# Patient Record
Sex: FEMALE | Race: BLACK OR AFRICAN AMERICAN | NOT HISPANIC OR LATINO
[De-identification: names, ages, dates, MRNs, and addresses within clinical notes are randomized per-mention and may not be internally consistent; named-entity substitution may affect disease eponyms.]

---

## 2021-01-18 PROBLEM — Z00.00 ENCOUNTER FOR PREVENTIVE HEALTH EXAMINATION: Status: ACTIVE | Noted: 2021-01-18

## 2021-01-19 ENCOUNTER — ASOB RESULT (OUTPATIENT)
Age: 31
End: 2021-01-19

## 2021-01-19 ENCOUNTER — APPOINTMENT (OUTPATIENT)
Dept: ANTEPARTUM | Facility: CLINIC | Age: 31
End: 2021-01-19
Payer: COMMERCIAL

## 2021-01-19 PROCEDURE — 76816 OB US FOLLOW-UP PER FETUS: CPT

## 2021-01-19 PROCEDURE — 99072 ADDL SUPL MATRL&STAF TM PHE: CPT

## 2021-01-19 PROCEDURE — 76819 FETAL BIOPHYS PROFIL W/O NST: CPT

## 2021-02-11 ENCOUNTER — TRANSCRIPTION ENCOUNTER (OUTPATIENT)
Age: 31
End: 2021-02-11

## 2021-02-12 ENCOUNTER — INPATIENT (INPATIENT)
Facility: HOSPITAL | Age: 31
LOS: 2 days | Discharge: HOME | End: 2021-02-15
Attending: OBSTETRICS & GYNECOLOGY | Admitting: OBSTETRICS & GYNECOLOGY

## 2021-02-12 VITALS
SYSTOLIC BLOOD PRESSURE: 116 MMHG | RESPIRATION RATE: 16 BRPM | TEMPERATURE: 98 F | DIASTOLIC BLOOD PRESSURE: 67 MMHG | HEART RATE: 82 BPM

## 2021-02-12 DIAGNOSIS — Z3A.00 WEEKS OF GESTATION OF PREGNANCY NOT SPECIFIED: ICD-10-CM

## 2021-02-12 DIAGNOSIS — O26.899 OTHER SPECIFIED PREGNANCY RELATED CONDITIONS, UNSPECIFIED TRIMESTER: ICD-10-CM

## 2021-02-12 LAB
BASOPHILS # BLD AUTO: 0 K/UL — SIGNIFICANT CHANGE UP (ref 0–0.2)
BASOPHILS NFR BLD AUTO: 0 % — SIGNIFICANT CHANGE UP (ref 0–2)
BLD GP AB SCN SERPL QL: NEGATIVE — SIGNIFICANT CHANGE UP
EOSINOPHIL # BLD AUTO: 0 K/UL — SIGNIFICANT CHANGE UP (ref 0–0.5)
EOSINOPHIL NFR BLD AUTO: 0 % — SIGNIFICANT CHANGE UP (ref 0–6)
GIANT PLATELETS BLD QL SMEAR: PRESENT — SIGNIFICANT CHANGE UP
HBV SURFACE AG SER-ACNC: SIGNIFICANT CHANGE UP
HCT VFR BLD CALC: 36.5 % — SIGNIFICANT CHANGE UP (ref 34.5–45)
HGB BLD-MCNC: 12.1 G/DL — SIGNIFICANT CHANGE UP (ref 11.5–15.5)
HIV 1+2 AB+HIV1 P24 AG SERPL QL IA: SIGNIFICANT CHANGE UP
IANC: 7.41 K/UL — SIGNIFICANT CHANGE UP (ref 1.5–8.5)
LYMPHOCYTES # BLD AUTO: 1.98 K/UL — SIGNIFICANT CHANGE UP (ref 1–3.3)
LYMPHOCYTES # BLD AUTO: 18.6 % — SIGNIFICANT CHANGE UP (ref 13–44)
MANUAL SMEAR VERIFICATION: SIGNIFICANT CHANGE UP
MCHC RBC-ENTMCNC: 30.4 PG — SIGNIFICANT CHANGE UP (ref 27–34)
MCHC RBC-ENTMCNC: 33.2 GM/DL — SIGNIFICANT CHANGE UP (ref 32–36)
MCV RBC AUTO: 91.7 FL — SIGNIFICANT CHANGE UP (ref 80–100)
MONOCYTES # BLD AUTO: 0.19 K/UL — SIGNIFICANT CHANGE UP (ref 0–0.9)
MONOCYTES NFR BLD AUTO: 1.8 % — LOW (ref 2–14)
NEUTROPHILS # BLD AUTO: 8.1 K/UL — HIGH (ref 1.8–7.4)
NEUTROPHILS NFR BLD AUTO: 76.1 % — SIGNIFICANT CHANGE UP (ref 43–77)
PLAT MORPH BLD: NORMAL — SIGNIFICANT CHANGE UP
PLATELET # BLD AUTO: 113 K/UL — LOW (ref 150–400)
PLATELET COUNT - ESTIMATE: ABNORMAL
POLYCHROMASIA BLD QL SMEAR: SLIGHT — SIGNIFICANT CHANGE UP
RBC # BLD: 3.98 M/UL — SIGNIFICANT CHANGE UP (ref 3.8–5.2)
RBC # FLD: 13.8 % — SIGNIFICANT CHANGE UP (ref 10.3–14.5)
RBC BLD AUTO: NORMAL — SIGNIFICANT CHANGE UP
RH IG SCN BLD-IMP: POSITIVE — SIGNIFICANT CHANGE UP
RH IG SCN BLD-IMP: POSITIVE — SIGNIFICANT CHANGE UP
SARS-COV-2 IGG SERPL QL IA: NEGATIVE — SIGNIFICANT CHANGE UP
SARS-COV-2 IGM SERPL IA-ACNC: 0.08 INDEX — SIGNIFICANT CHANGE UP
SARS-COV-2 RNA SPEC QL NAA+PROBE: SIGNIFICANT CHANGE UP
T PALLIDUM AB TITR SER: NEGATIVE — SIGNIFICANT CHANGE UP
VARIANT LYMPHS # BLD: 3.5 % — SIGNIFICANT CHANGE UP (ref 0–6)
WBC # BLD: 10.64 K/UL — HIGH (ref 3.8–10.5)
WBC # FLD AUTO: 10.64 K/UL — HIGH (ref 3.8–10.5)

## 2021-02-12 RX ORDER — HEPARIN SODIUM 5000 [USP'U]/ML
5000 INJECTION INTRAVENOUS; SUBCUTANEOUS EVERY 12 HOURS
Refills: 0 | Status: DISCONTINUED | OUTPATIENT
Start: 2021-02-12 | End: 2021-02-15

## 2021-02-12 RX ORDER — SIMETHICONE 80 MG/1
80 TABLET, CHEWABLE ORAL EVERY 4 HOURS
Refills: 0 | Status: DISCONTINUED | OUTPATIENT
Start: 2021-02-12 | End: 2021-02-15

## 2021-02-12 RX ORDER — LANOLIN
1 OINTMENT (GRAM) TOPICAL EVERY 6 HOURS
Refills: 0 | Status: DISCONTINUED | OUTPATIENT
Start: 2021-02-12 | End: 2021-02-15

## 2021-02-12 RX ORDER — OXYTOCIN 10 UNIT/ML
2 VIAL (ML) INJECTION
Qty: 30 | Refills: 0 | Status: DISCONTINUED | OUTPATIENT
Start: 2021-02-12 | End: 2021-02-13

## 2021-02-12 RX ORDER — IBUPROFEN 200 MG
1 TABLET ORAL
Qty: 0 | Refills: 0 | DISCHARGE
Start: 2021-02-12

## 2021-02-12 RX ORDER — SODIUM CHLORIDE 9 MG/ML
1000 INJECTION, SOLUTION INTRAVENOUS
Refills: 0 | Status: DISCONTINUED | OUTPATIENT
Start: 2021-02-12 | End: 2021-02-14

## 2021-02-12 RX ORDER — TETANUS TOXOID, REDUCED DIPHTHERIA TOXOID AND ACELLULAR PERTUSSIS VACCINE, ADSORBED 5; 2.5; 8; 8; 2.5 [IU]/.5ML; [IU]/.5ML; UG/.5ML; UG/.5ML; UG/.5ML
0.5 SUSPENSION INTRAMUSCULAR ONCE
Refills: 0 | Status: DISCONTINUED | OUTPATIENT
Start: 2021-02-12 | End: 2021-02-15

## 2021-02-12 RX ORDER — METOCLOPRAMIDE HCL 10 MG
10 TABLET ORAL ONCE
Refills: 0 | Status: COMPLETED | OUTPATIENT
Start: 2021-02-12 | End: 2021-02-12

## 2021-02-12 RX ORDER — OXYCODONE HYDROCHLORIDE 5 MG/1
5 TABLET ORAL ONCE
Refills: 0 | Status: DISCONTINUED | OUTPATIENT
Start: 2021-02-12 | End: 2021-02-15

## 2021-02-12 RX ORDER — OXYCODONE HYDROCHLORIDE 5 MG/1
5 TABLET ORAL
Refills: 0 | Status: COMPLETED | OUTPATIENT
Start: 2021-02-12 | End: 2021-02-19

## 2021-02-12 RX ORDER — SODIUM CHLORIDE 9 MG/ML
1000 INJECTION, SOLUTION INTRAVENOUS
Refills: 0 | Status: DISCONTINUED | OUTPATIENT
Start: 2021-02-12 | End: 2021-02-12

## 2021-02-12 RX ORDER — FAMOTIDINE 10 MG/ML
20 INJECTION INTRAVENOUS ONCE
Refills: 0 | Status: COMPLETED | OUTPATIENT
Start: 2021-02-12 | End: 2021-02-12

## 2021-02-12 RX ORDER — ACETAMINOPHEN 500 MG
3 TABLET ORAL
Qty: 0 | Refills: 0 | DISCHARGE
Start: 2021-02-12

## 2021-02-12 RX ORDER — OXYTOCIN 10 UNIT/ML
2 VIAL (ML) INJECTION
Qty: 30 | Refills: 0 | Status: DISCONTINUED | OUTPATIENT
Start: 2021-02-12 | End: 2021-02-12

## 2021-02-12 RX ORDER — OXYTOCIN 10 UNIT/ML
333.33 VIAL (ML) INJECTION
Qty: 20 | Refills: 0 | Status: DISCONTINUED | OUTPATIENT
Start: 2021-02-12 | End: 2021-02-13

## 2021-02-12 RX ORDER — IBUPROFEN 200 MG
600 TABLET ORAL EVERY 6 HOURS
Refills: 0 | Status: COMPLETED | OUTPATIENT
Start: 2021-02-12 | End: 2022-01-11

## 2021-02-12 RX ORDER — OXYTOCIN 10 UNIT/ML
333.33 VIAL (ML) INJECTION
Qty: 20 | Refills: 0 | Status: DISCONTINUED | OUTPATIENT
Start: 2021-02-12 | End: 2021-02-12

## 2021-02-12 RX ORDER — SODIUM CHLORIDE 9 MG/ML
250 INJECTION, SOLUTION INTRAVENOUS ONCE
Refills: 0 | Status: COMPLETED | OUTPATIENT
Start: 2021-02-12 | End: 2021-02-12

## 2021-02-12 RX ORDER — AMPICILLIN TRIHYDRATE 250 MG
1 CAPSULE ORAL EVERY 4 HOURS
Refills: 0 | Status: DISCONTINUED | OUTPATIENT
Start: 2021-02-12 | End: 2021-02-12

## 2021-02-12 RX ORDER — ACETAMINOPHEN 500 MG
975 TABLET ORAL
Refills: 0 | Status: DISCONTINUED | OUTPATIENT
Start: 2021-02-12 | End: 2021-02-15

## 2021-02-12 RX ORDER — DIPHENHYDRAMINE HCL 50 MG
25 CAPSULE ORAL EVERY 6 HOURS
Refills: 0 | Status: DISCONTINUED | OUTPATIENT
Start: 2021-02-12 | End: 2021-02-15

## 2021-02-12 RX ORDER — AMPICILLIN TRIHYDRATE 250 MG
2 CAPSULE ORAL ONCE
Refills: 0 | Status: COMPLETED | OUTPATIENT
Start: 2021-02-12 | End: 2021-02-12

## 2021-02-12 RX ORDER — KETOROLAC TROMETHAMINE 30 MG/ML
30 SYRINGE (ML) INJECTION EVERY 6 HOURS
Refills: 0 | Status: DISCONTINUED | OUTPATIENT
Start: 2021-02-12 | End: 2021-02-13

## 2021-02-12 RX ORDER — MAGNESIUM HYDROXIDE 400 MG/1
30 TABLET, CHEWABLE ORAL
Refills: 0 | Status: DISCONTINUED | OUTPATIENT
Start: 2021-02-12 | End: 2021-02-15

## 2021-02-12 RX ORDER — SODIUM CHLORIDE 9 MG/ML
500 INJECTION, SOLUTION INTRAVENOUS ONCE
Refills: 0 | Status: COMPLETED | OUTPATIENT
Start: 2021-02-12 | End: 2021-02-12

## 2021-02-12 RX ORDER — CITRIC ACID/SODIUM CITRATE 300-500 MG
30 SOLUTION, ORAL ORAL ONCE
Refills: 0 | Status: COMPLETED | OUTPATIENT
Start: 2021-02-12 | End: 2021-02-12

## 2021-02-12 RX ADMIN — Medication 10 MILLIGRAM(S): at 13:45

## 2021-02-12 RX ADMIN — SODIUM CHLORIDE 500 MILLILITER(S): 9 INJECTION, SOLUTION INTRAVENOUS at 12:15

## 2021-02-12 RX ADMIN — HEPARIN SODIUM 5000 UNIT(S): 5000 INJECTION INTRAVENOUS; SUBCUTANEOUS at 21:00

## 2021-02-12 RX ADMIN — Medication 2 MILLIUNIT(S)/MIN: at 06:57

## 2021-02-12 RX ADMIN — Medication 216 GRAM(S): at 02:49

## 2021-02-12 RX ADMIN — Medication 108 GRAM(S): at 11:15

## 2021-02-12 RX ADMIN — Medication 2 MILLIUNIT(S)/MIN: at 12:16

## 2021-02-12 RX ADMIN — SODIUM CHLORIDE 75 MILLILITER(S): 9 INJECTION, SOLUTION INTRAVENOUS at 15:42

## 2021-02-12 RX ADMIN — Medication 108 GRAM(S): at 06:54

## 2021-02-12 RX ADMIN — Medication 975 MILLIGRAM(S): at 21:00

## 2021-02-12 RX ADMIN — SODIUM CHLORIDE 500 MILLILITER(S): 9 INJECTION, SOLUTION INTRAVENOUS at 10:15

## 2021-02-12 RX ADMIN — FAMOTIDINE 20 MILLIGRAM(S): 10 INJECTION INTRAVENOUS at 13:45

## 2021-02-12 RX ADMIN — Medication 30 MILLILITER(S): at 13:45

## 2021-02-12 RX ADMIN — Medication 1000 MILLIUNIT(S)/MIN: at 15:25

## 2021-02-12 NOTE — OB RN TRIAGE NOTE - PMH
<<----- Click to add NO pertinent Past Medical History No pertinent past medical history Spontaneous   2020

## 2021-02-12 NOTE — OB PROVIDER TRIAGE NOTE - HISTORY OF PRESENT ILLNESS
30y black female  at 38w5d presents to triage c/o contractions q 6mins at home  Reports +FM, no vaginal bleeding, no ROM or LOF  Prenatal care: Dr White  GBS: Positive

## 2021-02-12 NOTE — OB PROVIDER TRIAGE NOTE - NSOBPROVIDERNOTE_OBGYN_ALL_OB_FT
30y black female  at 38w5d in labor  GBS: Positive  D/w Dr Can/ Dr Mena  -Admit to labor and delivery  -Pain Management: Epidural  -Cont EFM/[place]  -Admission labs: CBC, RPR, T&S and Covid  -IV hydration  -Clear liquid diet

## 2021-02-12 NOTE — DISCHARGE NOTE OB - PATIENT PORTAL LINK FT
You can access the FollowMyHealth Patient Portal offered by [facility] by registering at the following website: [email or web address]. By joining [facility]’s FollowMyHealth portal, you will also be able to view your health information using other applications (apps) compatible with our system.

## 2021-02-12 NOTE — OB PROVIDER H&P - ASSESSMENT
30y black female  at 38w5d in labor  GBS: Positive  D/w Dr Can/ Dr Mena  -Admit to labor and delivery  -Pain Management: Epidural  -Cont EFM/[place]  -Admission labs: CBC, RPR, T&S and Covid  -IV hydration  -Clear liquid diet  -Ampicillin

## 2021-02-12 NOTE — OB RN DELIVERY SUMMARY - NS_LABORCHARACTER_OBGYN_ALL_OB
Induction of labor-AROM/Induction of labor-Medicinal/External electronic FM/Antibiotics in labor/Meconium staining

## 2021-02-12 NOTE — DISCHARGE NOTE OB - HOSPITAL COURSE
Patient underwent an uncomplicated primary LTCS for NRFHT.      Hct: 36.5  Patient’s postoperative course was unremarkable and she remained hemodynamically stable and afebrile throughout. Upon discharge on POD2, the patient was ambulating and voiding spontaneously, tolerating oral intake, pain was well controlled with oral medication, and vital signs were stable.

## 2021-02-12 NOTE — OB PROVIDER H&P - PROBLEM SELECTOR PLAN 1
D/w Dr Can/ Dr Mena  -Admit to labor and delivery  -Pain Management: Epidural  -Cont EFM/[place]  -Admission labs: CBC, RPR, T&S and Covid  -IV hydration  -Clear liquid diet  -Ampicillin

## 2021-02-12 NOTE — DISCHARGE NOTE OB - PROVIDER TOKENS
FREE:[LAST:[[facility]],PHONE:[phone]],FAX:[(   )    -],ADDRESS:[Ambulatory Care Unit, Oncology [facility]  [phone] [place] [address]]]

## 2021-02-12 NOTE — DISCHARGE NOTE OB - CARE PLAN
Principal Discharge DX:	 delivery delivered  Goal:	full recovery  Assessment and plan of treatment:	Make your follow-up appointment with your doctor as ordered. No heavy lifting, driving, or strenuous activity for 6 weeks. Nothing per vagina such as tampons, intercourse, douches or tub baths for 6 weeks or until you see your doctor. Call your doctor with any signs and symptoms of infection such as fever, chills, nausea or vomiting. Call your doctor with redness or swelling at the incision site, fluid leakage or wound separation. Call your doctor if you’re unable to tolerate food, increase in vaginal bleeding, or have difficulty urinating. Call your doctor if you have pain that is not relieved by your prescribed medications. Notify your doctor with any other concerns. Call [phone] if you have any of these concerns in the next 6 weeks.    Please schedule appointments to see us in the Ob/Gyn clinic.   Please schedule one appointment TWO WEEKS from discharge for a post operative incision check.   Please schedule another appointment SIX WEEKS from discharge for a routine post partum visit.

## 2021-02-12 NOTE — DISCHARGE NOTE OB - ADDITIONAL INSTRUCTIONS
Make your follow-up appointment with your doctor as ordered. No heavy lifting, driving, or strenuous activity for 6 weeks. Nothing per vagina such as tampons, intercourse, douches or tub baths for 6 weeks or until you see your doctor. Call your doctor with any signs and symptoms of infection such as fever, chills, nausea or vomiting. Call your doctor with redness or swelling at the incision site, fluid leakage or wound separation. Call your doctor if you’re unable to tolerate food, increase in vaginal bleeding, or have difficulty urinating. Call your doctor if you have pain that is not relieved by your prescribed medications. Notify your doctor with any other concerns. Call [phone] if you have any of these concerns in the next 6 weeks.    Please schedule appointments to see us in the Ob/Gyn clinic.   Please schedule one appointment TWO WEEKS from discharge for a post operative incision check.   Please schedule another appointment SIX WEEKS from discharge for a routine post partum visit.

## 2021-02-12 NOTE — OB PROVIDER H&P - NSHPPHYSICALEXAM_GEN_ALL_CORE
T(C): 36.5 (02-12-21 @ 01:10), Max: 36.5 (02-12-21 @ 01:10)  HR: 82 (02-12-21 @ 01:10) (82 - 82)  BP: 116/67 (02-12-21 @ 01:10) (116/67 - 116/67)  RR: 16 (02-12-21 @ 01:10) (16 - 16)    Heart: RRR  Lungs: CTA  Abdomen: Gravid, soft, NT    NST: Reactive with moderate variability, Category 1 tracing  [place]: Regular contractions  VE: 4/60/-3, intact membranes

## 2021-02-12 NOTE — OB PROVIDER TRIAGE NOTE - NSHPPHYSICALEXAM_GEN_ALL_CORE
T(C): 36.5 (02-12-21 @ 01:10), Max: 36.5 (02-12-21 @ 01:10)  HR: 82 (02-12-21 @ 01:10) (82 - 82)  BP: 116/67 (02-12-21 @ 01:10) (116/67 - 116/67)  RR: 16 (02-12-21 @ 01:10) (16 - 16)    Heart: RRR  Lungs: CTA  Abdomen: Gravid, soft, NT    NST: Reactive with moderate variability, Category 1 tracing  [place]: Regular contractions  VE: 4/60/-3, intact membranes

## 2021-02-12 NOTE — DISCHARGE NOTE OB - MEDICATION SUMMARY - MEDICATIONS TO TAKE
I will START or STAY ON the medications listed below when I get home from the hospital:    acetaminophen 325 mg oral tablet  -- 3 tab(s) by mouth   -- Indication: For Pain    ibuprofen 600 mg oral tablet  -- 1 tab(s) by mouth every 6 hours  -- Indication: For Pain    PNV Prenatal oral tablet  -- 1 tab(s) by mouth once a day  -- Indication: For Vitamins   I will START or STAY ON the medications listed below when I get home from the hospital:    ibuprofen 600 mg oral tablet  -- 1 tab(s) by mouth every 6 hours, As Needed  -- Indication: For pain    acetaminophen 325 mg oral tablet  -- 3 tab(s) by mouth , As Needed  -- Indication: For pain    PNV Prenatal oral tablet  -- 1 tab(s) by mouth once a day  -- Indication: For Vitamins

## 2021-02-12 NOTE — DISCHARGE NOTE OB - MATERIALS PROVIDED
[facility] [place] Screening Program/[place]  Immunization Record/Breastfeeding Log/Bottle Feeding Log/Guide to Postpartum Care/Shaken Baby Prevention Handout/Breastfeeding Guide and Packet/Birth Certificate Instructions/Discharge Medication Information for Patients and Families Pocket Guide

## 2021-02-12 NOTE — OB NEONATOLOGY/PEDIATRICIAN DELIVERY SUMMARY - NSPEDSNEONOTESA_OBGYN_ALL_OB_FT
Called by Dr. White to attend  C/S delivery due to meconium . Baby is  product of a 38.5 week gestation born to a G 2 P 0010   30 year old female   Maternal labs include Blood Type  B+, HIV neg. , RPR NR , Hep B[- ], GBS + on 2/4/21, rest is unremarkable.  .   ROM at  1200, approximately  2.5 hrs.  Resuscitation included: WDSS.  Apgars were: 9&9 . EOS score 0.09 Admit to NBN.  C.

## 2021-02-12 NOTE — CHART NOTE - NSCHARTNOTEFT_GEN_A_CORE
PA NOte    patient seen & examined for placement of IUPC    VS  T(C): 36.6 (02-12-21 @ 11:15)  HR: 75 (02-12-21 @ 12:55)  BP: 92/54 (02-12-21 @ 12:55)  RR: 18 (02-12-21 @ 03:45)  SpO2: 99% (02-12-21 @ 12:48)    /mod davi/intermittent late decels  [place] q 2-5min  4/60/-3 IUPC placed    cont efm/toco  cont resuscitative measures  pitocin continues  dw MFM fellow Dr. Karena denson
Pt evaluated for cat 2 tracing. Pt having infrequent ctx. Pitocin discontinued.   Unchanged cervical exam. SVE: 60/-3  AROM, light mec. Pt counseled on possible  delivery if cat 2 tracing persists. Will continue to monitor closely and will restart pitocin when able.   D/w Dr. Lourdes Shankar. pgy4
R3 Chart Note:     Labor course and FHT reviewed. FHT cat II. SVE is unchanged at 4/60/-3, IUPC placed on the most recent exam. Patient's exam has been unchanged since admission despite interventions. She has been on pitocin intermittently since 7am, occasionally paused due to tracing concerns. The pitocin is now off. Given the FHT is cat II and the fact that the patient is remote from delivery and the tracing does not tolerate pitocin, recommended delivery by . Discussed the reason for the recommendation with the patient. Discussed the risks of  including but not limited to bleeding, infection, risk to injury to surrounding organs and blood vessels/nerves, VTE, and anesthetic risks. ALl questions answered to the patient's apparent satisfaction.     Patient agrees for  delivery. Pre-op meds top be ordered. Will advise Anesthesia and the Charge RN.     D/w Dr. Granados   D/w Dr. Layla Saab PGY-3
Pt evaluated at bedside. Unchanged cervical exam. SVE: 4/70/-3    100/56    80 afebrile    FH,:140/mod davi/-accel/-decel  [place]: Q3-4 mins    Cat 1 tracing. Will reassess for cervical change in two hours. C/w pitocin augmentation  RShibata, pgy4

## 2021-02-12 NOTE — DISCHARGE NOTE OB - CARE PROVIDER_API CALL
[facility],   Ambulatory Care Unit, Oncology [facility]  [address] [place] [address]  Phone: [phone]  Fax: (   )    -  Follow Up Time:

## 2021-02-12 NOTE — OB PROVIDER LABOR PROGRESS NOTE - NS_SUBJECTIVE/OBJECTIVE_OBGYN_ALL_OB_FT
PGY1 Labor & Delivery Progress Note     Pt seen & examined at bedside.    T(C): 36.9 (02-12-21 @ 03:45), Max: 36.9 (02-12-21 @ 02:53)  HR: 96 (02-12-21 @ 05:32) (57 - 99)  BP: 99/56 (02-12-21 @ 05:32) (75/43 - 125/75)  RR: 18 (02-12-21 @ 03:45) (16 - 18)  SpO2: 96% (02-12-21 @ 05:22) (96% - 99%)

## 2021-02-12 NOTE — DISCHARGE NOTE OB - PLAN OF CARE
Make your follow-up appointment with your doctor as ordered. No heavy lifting, driving, or strenuous activity for 6 weeks. Nothing per vagina such as tampons, intercourse, douches or tub baths for 6 weeks or until you see your doctor. Call your doctor with any signs and symptoms of infection such as fever, chills, nausea or vomiting. Call your doctor with redness or swelling at the incision site, fluid leakage or wound separation. Call your doctor if you’re unable to tolerate food, increase in vaginal bleeding, or have difficulty urinating. Call your doctor if you have pain that is not relieved by your prescribed medications. Notify your doctor with any other concerns. Call [phone] if you have any of these concerns in the next 6 weeks.    Please schedule appointments to see us in the Ob/Gyn clinic.   Please schedule one appointment TWO WEEKS from discharge for a post operative incision check.   Please schedule another appointment SIX WEEKS from discharge for a routine post partum visit. full recovery

## 2021-02-12 NOTE — OB PROVIDER DELIVERY SUMMARY - NSPROVIDERDELIVERYNOTE_OBGYN_ALL_OB_FT
Delivery of viable male infant, apgars 9,9, weight_____, cephalic presentation. Grossly normal uterus, tubes, ovaries. Hysterotomy closed in 2 layers. Excellent hemostasis achieved. QBL 232ml, IVF 1500ml, UOP 125ml. Delivery of viable male infant, apgars 9,9, weight 3250g, cephalic presentation. Grossly normal uterus, tubes, ovaries. Hysterotomy closed in 2 layers. Excellent hemostasis achieved. QBL 232ml, IVF 1500ml, UOP 125ml.

## 2021-02-12 NOTE — OB PROVIDER LABOR PROGRESS NOTE - ASSESSMENT
Plan: 31y/o  @38w5d in stable condition  - augmentation w/ pitocin  - Continuous EFM, [place]  - Con't IVF    D/W attending physician Dr. Rajesh Garvin MD  PGY-1

## 2021-02-12 NOTE — OB RN DELIVERY SUMMARY - NS_SEPSISRSKCALC_OBGYN_ALL_OB_FT
EOS calculated successfully. EOS Risk Factor: 0.5/1000 live births ([facility] national incidence); GA=38w5d; Temp=98.42; ROM=2.367; GBS='Positive'; Antibiotics='GBS specific antibiotics > 2 hrs prior to birth'

## 2021-02-13 LAB
BASOPHILS # BLD AUTO: 0.03 K/UL — SIGNIFICANT CHANGE UP (ref 0–0.2)
BASOPHILS NFR BLD AUTO: 0.2 % — SIGNIFICANT CHANGE UP (ref 0–2)
EOSINOPHIL # BLD AUTO: 0.03 K/UL — SIGNIFICANT CHANGE UP (ref 0–0.5)
EOSINOPHIL NFR BLD AUTO: 0.2 % — SIGNIFICANT CHANGE UP (ref 0–6)
HCT VFR BLD CALC: 29.5 % — LOW (ref 34.5–45)
HGB BLD-MCNC: 9.9 G/DL — LOW (ref 11.5–15.5)
IANC: 12.69 K/UL — HIGH (ref 1.5–8.5)
IMM GRANULOCYTES NFR BLD AUTO: 0.7 % — SIGNIFICANT CHANGE UP (ref 0–1.5)
LYMPHOCYTES # BLD AUTO: 12.3 % — LOW (ref 13–44)
LYMPHOCYTES # BLD AUTO: 2.02 K/UL — SIGNIFICANT CHANGE UP (ref 1–3.3)
MCHC RBC-ENTMCNC: 30.8 PG — SIGNIFICANT CHANGE UP (ref 27–34)
MCHC RBC-ENTMCNC: 33.6 GM/DL — SIGNIFICANT CHANGE UP (ref 32–36)
MCV RBC AUTO: 91.9 FL — SIGNIFICANT CHANGE UP (ref 80–100)
MONOCYTES # BLD AUTO: 1.55 K/UL — HIGH (ref 0–0.9)
MONOCYTES NFR BLD AUTO: 9.4 % — SIGNIFICANT CHANGE UP (ref 2–14)
NEUTROPHILS # BLD AUTO: 12.69 K/UL — HIGH (ref 1.8–7.4)
NEUTROPHILS NFR BLD AUTO: 77.2 % — HIGH (ref 43–77)
NRBC # BLD: 0 /100 WBCS — SIGNIFICANT CHANGE UP
NRBC # FLD: 0 K/UL — SIGNIFICANT CHANGE UP
PLATELET # BLD AUTO: 91 K/UL — LOW (ref 150–400)
RBC # BLD: 3.21 M/UL — LOW (ref 3.8–5.2)
RBC # FLD: 13.6 % — SIGNIFICANT CHANGE UP (ref 10.3–14.5)
RUBV IGG SER-ACNC: 13.4 INDEX — SIGNIFICANT CHANGE UP
RUBV IGG SER-IMP: POSITIVE — SIGNIFICANT CHANGE UP
WBC # BLD: 16.43 K/UL — HIGH (ref 3.8–10.5)
WBC # FLD AUTO: 16.43 K/UL — HIGH (ref 3.8–10.5)

## 2021-02-13 RX ORDER — IBUPROFEN 200 MG
600 TABLET ORAL EVERY 6 HOURS
Refills: 0 | Status: DISCONTINUED | OUTPATIENT
Start: 2021-02-13 | End: 2021-02-15

## 2021-02-13 RX ORDER — OXYCODONE HYDROCHLORIDE 5 MG/1
5 TABLET ORAL
Refills: 0 | Status: DISCONTINUED | OUTPATIENT
Start: 2021-02-13 | End: 2021-02-15

## 2021-02-13 RX ORDER — FERROUS SULFATE 325(65) MG
325 TABLET ORAL DAILY
Refills: 0 | Status: DISCONTINUED | OUTPATIENT
Start: 2021-02-13 | End: 2021-02-15

## 2021-02-13 RX ORDER — SENNA PLUS 8.6 MG/1
1 TABLET ORAL
Refills: 0 | Status: DISCONTINUED | OUTPATIENT
Start: 2021-02-13 | End: 2021-02-15

## 2021-02-13 RX ADMIN — Medication 325 MILLIGRAM(S): at 11:52

## 2021-02-13 RX ADMIN — HEPARIN SODIUM 5000 UNIT(S): 5000 INJECTION INTRAVENOUS; SUBCUTANEOUS at 08:52

## 2021-02-13 RX ADMIN — MAGNESIUM HYDROXIDE 30 MILLILITER(S): 400 TABLET, CHEWABLE ORAL at 06:00

## 2021-02-13 RX ADMIN — Medication 975 MILLIGRAM(S): at 08:52

## 2021-02-13 RX ADMIN — Medication 30 MILLIGRAM(S): at 11:52

## 2021-02-13 RX ADMIN — Medication 600 MILLIGRAM(S): at 23:40

## 2021-02-13 RX ADMIN — Medication 30 MILLIGRAM(S): at 06:00

## 2021-02-13 RX ADMIN — HEPARIN SODIUM 5000 UNIT(S): 5000 INJECTION INTRAVENOUS; SUBCUTANEOUS at 20:59

## 2021-02-13 RX ADMIN — Medication 975 MILLIGRAM(S): at 20:59

## 2021-02-13 RX ADMIN — Medication 600 MILLIGRAM(S): at 17:20

## 2021-02-13 RX ADMIN — Medication 1 TABLET(S): at 11:53

## 2021-02-13 NOTE — PROGRESS NOTE ADULT - SUBJECTIVE AND OBJECTIVE BOX
OB Progress Note:  Delivery, POD#1    S: 29yo POD#1 s/p LTCS . Her pain is well controlled. She is tolerating a regular diet and ambulating. Has yet to pass flatus or void. No headache, RUQ pain, or vision changes. Denies chest pain, SOB, dizziness, lightheadedness, n/v, fever/chills, or any other concerns. No heavy vaginal bleeding.     O:   Vital Signs Last 24 Hrs  T(C): 37 (2021 07:01), Max: 37 (2021 07:01)  T(F): 98.6 (2021 07:01), Max: 98.6 (2021 07:01)  HR: 82 (2021 07:) (71 - 98)  BP: 97/59 (2021 07:01) (84/47 - 116/52)  BP(mean): 66 (2021 17:15) (63 - 80)  RR: 16 (2021 07:01) (9 - 23)  SpO2: 99% (2021 07:01) (94% - 100%)    PE:  General: NAD  Abdomen: soft, discomfort with palpation, incision c/d/i with dermabond  Extremities: No erythema, no pitting edema    Labs:  Blood type: B Positive  Rubella IgG: Positive ( @ 11:21)  RPR: Negative                          12.1   10.64<H> >-----------< 113<L>    (  @ 02:48 )             36.5            A/P: 29yo POD#1 s/p LTCS.  Patient is stable and doing well post-operatively.    - Continue regular diet  - Continue motrin, tylenol, oxycodone PRN for pain control  - F/u AM CBC    Emili Cuevas, PGY1

## 2021-02-13 NOTE — PROGRESS NOTE ADULT - ATTENDING COMMENTS
Pt seen and examined, agree with above resident note.    Pt reports good pain control with pain meds.  Denies HA, CP, SOB, calf pain, fevers or chills.  Tolerating reg diet -N/-V.  Pt reports +flatus/voiding without difficulty.  -BM.  Pt reports moderate lochia.    VSS  hct 36.5-->29.5 pt asymptomatic    General: NAD  abd: softly distended, tympanic to percussion, ATTP  Incision: C/D/I   Ext: no calf tenderness b/l      POD #1 sp PLTCS 2/2 NRFHT     Continue routine postop and postpartum care  encourage ambulation   continue pain meds    Nilda Tim MD

## 2021-02-14 LAB
APPEARANCE UR: ABNORMAL
BACTERIA # UR AUTO: ABNORMAL
BILIRUB UR-MCNC: NEGATIVE — SIGNIFICANT CHANGE UP
COLOR SPEC: COLORLESS — SIGNIFICANT CHANGE UP
DIFF PNL FLD: ABNORMAL
EPI CELLS # UR: 6 /HPF — HIGH (ref 0–5)
GLUCOSE UR QL: NEGATIVE — SIGNIFICANT CHANGE UP
HYALINE CASTS # UR AUTO: SIGNIFICANT CHANGE UP /LPF (ref 0–7)
KETONES UR-MCNC: NEGATIVE — SIGNIFICANT CHANGE UP
LEUKOCYTE ESTERASE UR-ACNC: ABNORMAL
NITRITE UR-MCNC: NEGATIVE — SIGNIFICANT CHANGE UP
PH UR: 7 — SIGNIFICANT CHANGE UP (ref 5–8)
PROT UR-MCNC: ABNORMAL
RBC CASTS # UR COMP ASSIST: SIGNIFICANT CHANGE UP /HPF (ref 0–4)
SP GR SPEC: 1.01 — SIGNIFICANT CHANGE UP (ref 1.01–1.02)
UROBILINOGEN FLD QL: SIGNIFICANT CHANGE UP
WBC UR QL: SIGNIFICANT CHANGE UP /HPF (ref 0–5)

## 2021-02-14 RX ADMIN — Medication 975 MILLIGRAM(S): at 10:00

## 2021-02-14 RX ADMIN — HEPARIN SODIUM 5000 UNIT(S): 5000 INJECTION INTRAVENOUS; SUBCUTANEOUS at 10:00

## 2021-02-14 RX ADMIN — Medication 600 MILLIGRAM(S): at 15:35

## 2021-02-14 RX ADMIN — HEPARIN SODIUM 5000 UNIT(S): 5000 INJECTION INTRAVENOUS; SUBCUTANEOUS at 22:21

## 2021-02-14 RX ADMIN — Medication 600 MILLIGRAM(S): at 23:00

## 2021-02-14 RX ADMIN — Medication 325 MILLIGRAM(S): at 15:35

## 2021-02-14 RX ADMIN — Medication 975 MILLIGRAM(S): at 18:30

## 2021-02-14 RX ADMIN — Medication 600 MILLIGRAM(S): at 06:10

## 2021-02-14 NOTE — PROGRESS NOTE ADULT - SUBJECTIVE AND OBJECTIVE BOX
OB Progress Note: pTLCS, POD#2    S: 29yo now  POD#2 s/p pLTCS  ml. PTen and examine at bedside no acute events overnight. Pt c/o of burning when voiding. Pain is well controlled. She is tolerating a regular diet and passing flatus. She is voiding spontaneously, and ambulating without difficulty. Denies CP/SOB. Denies lightheadedness/dizziness. Denies N/V.    O:  Vitals:  Vital Signs Last 24 Hrs  T(C): 36.7 (2021 22:19), Max: 37 (2021 07:01)  T(F): 98 (2021 22:19), Max: 98.6 (2021 07:01)  HR: 80 (:) (80 - 98)  BP: 109/88 (2021 22:19) (96/57 - 109/88)  BP(mean): --  RR: 16 (2021 22:19) (16 - 17)  SpO2: 100% (2021 22:19) (98% - 100%)    MEDICATIONS  (STANDING):  acetaminophen   Tablet .. 975 milliGRAM(s) Oral <User Schedule>  diphtheria/tetanus/pertussis (acellular) Vaccine (ADAcel) 0.5 milliLiter(s) IntraMuscular once  ferrous    sulfate 325 milliGRAM(s) Oral daily  heparin   Injectable 5000 Unit(s) SubCutaneous every 12 hours  ibuprofen  Tablet. 600 milliGRAM(s) Oral every 6 hours  prenatal multivitamin 1 Tablet(s) Oral daily      MEDICATIONS  (PRN):  diphenhydrAMINE 25 milliGRAM(s) Oral every 6 hours PRN Pruritus  lanolin Ointment 1 Application(s) Topical every 6 hours PRN Sore Nipples  magnesium hydroxide Suspension 30 milliLiter(s) Oral two times a day PRN Constipation  oxyCODONE    IR 5 milliGRAM(s) Oral once PRN Moderate to Severe Pain (4-10)  oxyCODONE    IR 5 milliGRAM(s) Oral every 3 hours PRN Moderate to Severe Pain (4-10)  senna 1 Tablet(s) Oral two times a day PRN Constipation  simethicone 80 milliGRAM(s) Chew every 4 hours PRN Gas      Labs:  Blood type: B Positive  Rubella IgG: Positive ( @ 11:21)  RPR: Negative                          9.9<L>   16.43<H> >-----------< 91<L>    (  @ 07:43 )             29.5<L>                        12.1   10.64<H> >-----------< 113<L>    (  @ 02:48 )             36.5      PE:  General: NAD  LUNGS: C/T b/l  CVS: S1S2 RRR  Abdomen: Soft, appropriately tender, incision c/d/i.  Extremities: No erythema, no pitting edema    A/P: 29yo now  POD#2 s/p pLTCS  ml. Patient is stable and doing well post-operatively.    - Continue regular diet.  - Increase ambulation.  - Continue motrin, tylenol, oxycodone PRN for pain control.   Acute blood loss anemia continue Iron and   - Discharge planning     Rama Rodriguez NP OB Progress Note: pTLCS, POD#2    S: 31yo now  POD#2 s/p pLTCS  ml. Pt. seen  and examine at bedside no acute events overnight. Pt c/o of burning sensation when voiding denies urinary frequency and urgency. Pain is well controlled. She is tolerating a regular diet and passing flatus. She is voiding spontaneously, and ambulating without difficulty. Denies CP/SOB. Denies lightheadedness/dizziness. Denies N/V.    O:  Vitals:  Vital Signs Last 24 Hrs  T(C): 36.7 (2021 22:19), Max: 37 (2021 07:01)  T(F): 98 (2021 22:19), Max: 98.6 (2021 07:01)  HR: 80 (2021 22:19) (80 - 98)  BP: 109/88 (2021 22:19) (96/57 - 109/88)  BP(mean): --  RR: 16 (2021 22:19) (16 - 17)  SpO2: 100% (2021 22:19) (98% - 100%)    MEDICATIONS  (STANDING):  acetaminophen   Tablet .. 975 milliGRAM(s) Oral <User Schedule>  diphtheria/tetanus/pertussis (acellular) Vaccine (ADAcel) 0.5 milliLiter(s) IntraMuscular once  ferrous    sulfate 325 milliGRAM(s) Oral daily  heparin   Injectable 5000 Unit(s) SubCutaneous every 12 hours  ibuprofen  Tablet. 600 milliGRAM(s) Oral every 6 hours  prenatal multivitamin 1 Tablet(s) Oral daily      MEDICATIONS  (PRN):  diphenhydrAMINE 25 milliGRAM(s) Oral every 6 hours PRN Pruritus  lanolin Ointment 1 Application(s) Topical every 6 hours PRN Sore Nipples  magnesium hydroxide Suspension 30 milliLiter(s) Oral two times a day PRN Constipation  oxyCODONE    IR 5 milliGRAM(s) Oral once PRN Moderate to Severe Pain (4-10)  oxyCODONE    IR 5 milliGRAM(s) Oral every 3 hours PRN Moderate to Severe Pain (4-10)  senna 1 Tablet(s) Oral two times a day PRN Constipation  simethicone 80 milliGRAM(s) Chew every 4 hours PRN Gas      Labs:  Blood type: B Positive  Rubella IgG: Positive ( @ 11:21)  RPR: Negative                          9.9<L>   16.43<H> >-----------< 91<L>    (  @ 07:43 )             29.5<L>                        12.1   10.64<H> >-----------< 113<L>    (  @ 02:48 )             36.5      PE:  General: NAD  LUNGS: C/T b/l  CVS: S1S2 RRR  Abdomen: Soft, appropriately tender, incision c/d/i.  Extremities: No erythema, no pitting edema    A/P: 31yo now  POD#2 s/p pLTCS  ml. Patient is stable and doing well post-operatively.    - Continue regular diet.  - Increase ambulation.  - Continue motrin, tylenol, oxycodone PRN for pain control.   -Acute blood loss anemia continue Iron and senna  - f/U with Urinalysis   - Discharge planning     Rama Rodriguez NP

## 2021-02-14 NOTE — PROVIDER CONTACT NOTE (OTHER) - SITUATION
Pt c/o burning sensation on urination early this AM.
Pt told RN she is having a burning sensation when urinating.
Pt verbalized concern that she still feels some burning sensation after she peed at this time. She has c/o burning sensation early morning and verbalized has not gotten worse, but no better neither.

## 2021-02-14 NOTE — PROGRESS NOTE ADULT - SUBJECTIVE AND OBJECTIVE BOX
OB Progress Note: LTCS, POD#2    S: 29yo POD#2 s/p LTCS. Pain is well controlled. She is tolerating a regular diet, voiding spontaneously, and ambulating without difficulty. Has yet to pass flatus. Denies CP/SOB. Denies lightheadedness/dizziness. Denies N/V.    O:  Vitals:  Vital Signs Last 24 Hrs  T(C): 36.9 (14 Feb 2021 06:49), Max: 37 (13 Feb 2021 07:01)  T(F): 98.4 (14 Feb 2021 06:49), Max: 98.6 (13 Feb 2021 07:01)  HR: 79 (14 Feb 2021 06:49) (79 - 98)  BP: 109/62 (14 Feb 2021 06:49) (96/57 - 109/88)  BP(mean): --  RR: 17 (14 Feb 2021 06:49) (16 - 17)  SpO2: 98% (14 Feb 2021 06:49) (98% - 100%)    PE:  General: NAD  Abdomen: Soft, appropriately tender, incision c/d/i with dermabond  Extremities: No erythema, no pitting edema    MEDICATIONS  (STANDING):  acetaminophen   Tablet .. 975 milliGRAM(s) Oral <User Schedule>  diphtheria/tetanus/pertussis (acellular) Vaccine (ADAcel) 0.5 milliLiter(s) IntraMuscular once  ferrous    sulfate 325 milliGRAM(s) Oral daily  heparin   Injectable 5000 Unit(s) SubCutaneous every 12 hours  ibuprofen  Tablet. 600 milliGRAM(s) Oral every 6 hours  prenatal multivitamin 1 Tablet(s) Oral daily      MEDICATIONS  (PRN):  diphenhydrAMINE 25 milliGRAM(s) Oral every 6 hours PRN Pruritus  lanolin Ointment 1 Application(s) Topical every 6 hours PRN Sore Nipples  magnesium hydroxide Suspension 30 milliLiter(s) Oral two times a day PRN Constipation  oxyCODONE    IR 5 milliGRAM(s) Oral once PRN Moderate to Severe Pain (4-10)  oxyCODONE    IR 5 milliGRAM(s) Oral every 3 hours PRN Moderate to Severe Pain (4-10)  senna 1 Tablet(s) Oral two times a day PRN Constipation  simethicone 80 milliGRAM(s) Chew every 4 hours PRN Gas      Labs:  Blood type: B Positive  Rubella IgG: Positive (02-12 @ 11:21)  RPR: Negative                          9.9<L>   16.43<H> >-----------< 91<L>    ( 02-13 @ 07:43 )             29.5<L>                        12.1   10.64<H> >-----------< 113<L>    ( 02-12 @ 02:48 )             36.5            A/P: 29yo POD#2 s/p pLTCS for NRFHT with 232cc EBL.  Patient is stable and doing well post-operatively.    - Continue regular diet  - Continue motrin, tylenol, oxycodone PRN for pain control  - baby not cleared for circumcision, to be done outpatient w/ urology  - discharge planning    Emili Cuevas, PGY1

## 2021-02-14 NOTE — PROGRESS NOTE ADULT - ATTENDING COMMENTS
Pt seen and examined, agree with above resident note.  Pt states the pain with urination felt like an "outer" or external discomfort which is now resolving.  UA contaminated, no indication for antibiotics at this time.  Pt denies HA, CP, SOB, calf pain, fevers/chills.  tolerating reg diet -n/-V.  +flatus and voiding without difficulty.     VSS  Hct 36.5-->29.5    General: NAD  Abd: softly distended, ATTP, tympanic to percussion  Incision: C/D/I  Ext: no calf tenderness b/l      POD #2 PLTCS 2/2 NRFHT  1.  pt doing well, desires to stay until tomorrow  2.  Likely dc home tomorrow if no acute events overnight    Nilda Tim MD

## 2021-02-14 NOTE — PROVIDER CONTACT NOTE (OTHER) - ASSESSMENT
VS wnl, denies chills and other s/s. Reports burning sensation upon urination subsiding. VS wnl, denies chills and other s/s. Reports burning sensation upon urination is subsiding.

## 2021-02-14 NOTE — PROVIDER CONTACT NOTE (OTHER) - ASSESSMENT
Voiding without difficulty. no increase infrequency. Pt has slight pressure lower pelvis. VSS WNL. Afebrile. Ambulating. Fundus firm, bleeding light.

## 2021-02-15 VITALS
HEART RATE: 97 BPM | TEMPERATURE: 98 F | DIASTOLIC BLOOD PRESSURE: 65 MMHG | OXYGEN SATURATION: 97 % | RESPIRATION RATE: 16 BRPM | SYSTOLIC BLOOD PRESSURE: 111 MMHG

## 2021-02-15 RX ADMIN — HEPARIN SODIUM 5000 UNIT(S): 5000 INJECTION INTRAVENOUS; SUBCUTANEOUS at 10:25

## 2021-02-15 RX ADMIN — Medication 600 MILLIGRAM(S): at 10:25

## 2021-02-15 NOTE — PROGRESS NOTE ADULT - SUBJECTIVE AND OBJECTIVE BOX
Patient seen and examined at bedside, no acute overnight events. Patient complaining of burning with urination during the day. Pain is otherwise well controlled. Patient is ambulating, voiding spontaneously, passing flatus, and tolerating regular diet. Denies CP, SOB, N/V, HA, blurred vision, epigastric pain.    Vital Signs Last 24 Hours  T(C): 36.8 (02-14-21 @ 22:43), Max: 36.9 (02-14-21 @ 06:49)  HR: 83 (02-14-21 @ 22:43) (79 - 87)  BP: 106/58 (02-14-21 @ 22:43) (102/63 - 109/62)  RR: 16 (02-14-21 @ 22:43) (16 - 18)  SpO2: 99% (02-14-21 @ 22:43) (98% - 100%)    Physical Exam:  General: NAD  Abdomen: Soft, non-tender, non-distended, fundus firm  Incision: Pfannenstiel incision CDI, subcuticular suture closure  Pelvic: Lochia wnl    Labs:    Blood Type: B Positive  Antibody Screen: Negative  Rubella IgG: Positive (Positive=Immune, Negative=Non-Immune)  RPR: Negative               9.9    16.43 )-----------( 91       ( 02-13 @ 07:43 )             29.5                12.1   10.64 )-----------( 113      ( 02-12 @ 02:48 )             36.5         MEDICATIONS  (STANDING):  acetaminophen   Tablet .. 975 milliGRAM(s) Oral <User Schedule>  diphtheria/tetanus/pertussis (acellular) Vaccine (ADAcel) 0.5 milliLiter(s) IntraMuscular once  ferrous    sulfate 325 milliGRAM(s) Oral daily  heparin   Injectable 5000 Unit(s) SubCutaneous every 12 hours  ibuprofen  Tablet. 600 milliGRAM(s) Oral every 6 hours  prenatal multivitamin 1 Tablet(s) Oral daily    MEDICATIONS  (PRN):  diphenhydrAMINE 25 milliGRAM(s) Oral every 6 hours PRN Pruritus  lanolin Ointment 1 Application(s) Topical every 6 hours PRN Sore Nipples  magnesium hydroxide Suspension 30 milliLiter(s) Oral two times a day PRN Constipation  oxyCODONE    IR 5 milliGRAM(s) Oral once PRN Moderate to Severe Pain (4-10)  oxyCODONE    IR 5 milliGRAM(s) Oral every 3 hours PRN Moderate to Severe Pain (4-10)  senna 1 Tablet(s) Oral two times a day PRN Constipation  simethicone 80 milliGRAM(s) Chew every 4 hours PRN Gas

## 2021-02-15 NOTE — PROGRESS NOTE ADULT - ASSESSMENT
31y/o POD#2 s/p pLTCS 2/2 NRFHT remote from delivery in stable condition. Patient is progressing well, meeting appropriate postpartum milestones. Tolerating PO, no N/V. Ambulating without difficulty. UA not concerning for infection, contmainated by blood. Irritation likely 2/2 Marcos catheter.

## 2021-02-15 NOTE — PROGRESS NOTE ADULT - PROBLEM SELECTOR PLAN 1
- Continue with po analgesia  - Increase ambulation  - Continue regular diet  - IV lock  - No labs  - Anticipat d/c today    ALEKSEY Garvin, PGY-1

## 2021-02-25 ENCOUNTER — NON-APPOINTMENT (OUTPATIENT)
Age: 31
End: 2021-02-25

## 2021-02-26 ENCOUNTER — TRANSCRIPTION ENCOUNTER (OUTPATIENT)
Age: 31
End: 2021-02-26

## 2021-11-19 NOTE — OB PROVIDER TRIAGE NOTE - NS_FETALMOVEMENT_OBGYN_ALL_OB
's follow-up visit to offer pre-procedural prayer attempted. Mrs. Kailee Silverman was in the procedure according to staff. I visited with patient's family in the surgery waiting room, conveying care and concern.      Maycol Vogt 11  Board Certified  Present, unchanged

## 2022-01-01 NOTE — OB RN PATIENT PROFILE - AS SC BRADEN ACTIVITY
[facility] Progress Note      Assessment:  Sravani Vásquez [facility] Name: Sravani Vásquez   : 2022  [facility] MRN:  [id number]    Carter Smith MD, M.D.  [facility]   2022 10:45 AM      (4) walks frequently

## 2022-03-27 ENCOUNTER — TRANSCRIPTION ENCOUNTER (OUTPATIENT)
Age: 32
End: 2022-03-27

## 2022-03-28 ENCOUNTER — INPATIENT (INPATIENT)
Facility: HOSPITAL | Age: 32
LOS: 0 days | Discharge: HOME | End: 2022-03-29
Attending: STUDENT IN AN ORGANIZED HEALTH CARE EDUCATION/TRAINING PROGRAM | Admitting: STUDENT IN AN ORGANIZED HEALTH CARE EDUCATION/TRAINING PROGRAM
Payer: COMMERCIAL

## 2022-03-28 ENCOUNTER — TRANSCRIPTION ENCOUNTER (OUTPATIENT)
Age: 32
End: 2022-03-28

## 2022-03-28 ENCOUNTER — RESULT REVIEW (OUTPATIENT)
Age: 32
End: 2022-03-28

## 2022-03-28 VITALS
HEART RATE: 74 BPM | SYSTOLIC BLOOD PRESSURE: 103 MMHG | OXYGEN SATURATION: 100 % | TEMPERATURE: 98 F | DIASTOLIC BLOOD PRESSURE: 61 MMHG | HEIGHT: 62 IN | RESPIRATION RATE: 12 BRPM

## 2022-03-28 DIAGNOSIS — O00.90 UNSPECIFIED ECTOPIC PREGNANCY WITHOUT INTRAUTERINE PREGNANCY: ICD-10-CM

## 2022-03-28 DIAGNOSIS — Z98.891 HISTORY OF UTERINE SCAR FROM PREVIOUS SURGERY: Chronic | ICD-10-CM

## 2022-03-28 PROBLEM — O03.9 COMPLETE OR UNSPECIFIED SPONTANEOUS ABORTION WITHOUT COMPLICATION: Chronic | Status: ACTIVE | Noted: 2021-02-12

## 2022-03-28 LAB
ALBUMIN SERPL ELPH-MCNC: 4.7 G/DL — SIGNIFICANT CHANGE UP (ref 3.3–5)
ALP SERPL-CCNC: 71 U/L — SIGNIFICANT CHANGE UP (ref 40–120)
ALT FLD-CCNC: 13 U/L — SIGNIFICANT CHANGE UP (ref 4–33)
ANION GAP SERPL CALC-SCNC: 12 MMOL/L — SIGNIFICANT CHANGE UP (ref 7–14)
APPEARANCE UR: CLEAR — SIGNIFICANT CHANGE UP
AST SERPL-CCNC: 16 U/L — SIGNIFICANT CHANGE UP (ref 4–32)
BASOPHILS # BLD AUTO: 0.03 K/UL — SIGNIFICANT CHANGE UP (ref 0–0.2)
BASOPHILS NFR BLD AUTO: 0.4 % — SIGNIFICANT CHANGE UP (ref 0–2)
BILIRUB SERPL-MCNC: <0.2 MG/DL — SIGNIFICANT CHANGE UP (ref 0.2–1.2)
BILIRUB UR-MCNC: NEGATIVE — SIGNIFICANT CHANGE UP
BLD GP AB SCN SERPL QL: NEGATIVE — SIGNIFICANT CHANGE UP
BUN SERPL-MCNC: 9 MG/DL — SIGNIFICANT CHANGE UP (ref 7–23)
CALCIUM SERPL-MCNC: 9.5 MG/DL — SIGNIFICANT CHANGE UP (ref 8.4–10.5)
CHLORIDE SERPL-SCNC: 102 MMOL/L — SIGNIFICANT CHANGE UP (ref 98–107)
CO2 SERPL-SCNC: 25 MMOL/L — SIGNIFICANT CHANGE UP (ref 22–31)
COLOR SPEC: SIGNIFICANT CHANGE UP
CREAT SERPL-MCNC: 0.84 MG/DL — SIGNIFICANT CHANGE UP (ref 0.5–1.3)
DIFF PNL FLD: NEGATIVE — SIGNIFICANT CHANGE UP
EGFR: 95 ML/MIN/1.73M2 — SIGNIFICANT CHANGE UP
EOSINOPHIL # BLD AUTO: 0.15 K/UL — SIGNIFICANT CHANGE UP (ref 0–0.5)
EOSINOPHIL NFR BLD AUTO: 2.1 % — SIGNIFICANT CHANGE UP (ref 0–6)
FLUAV + FLUBV RNA SPEC NAA+PROBE: SIGNIFICANT CHANGE UP
FLUAV AG NPH QL: SIGNIFICANT CHANGE UP
FLUBV AG NPH QL: SIGNIFICANT CHANGE UP
GLUCOSE SERPL-MCNC: 101 MG/DL — HIGH (ref 70–99)
GLUCOSE UR QL: NEGATIVE — SIGNIFICANT CHANGE UP
HCG SERPL-ACNC: SIGNIFICANT CHANGE UP MIU/ML
HCT VFR BLD CALC: 36.8 % — SIGNIFICANT CHANGE UP (ref 34.5–45)
HGB BLD-MCNC: 12.1 G/DL — SIGNIFICANT CHANGE UP (ref 11.5–15.5)
IANC: 4.31 K/UL — SIGNIFICANT CHANGE UP (ref 1.8–7.4)
IMM GRANULOCYTES NFR BLD AUTO: 0.3 % — SIGNIFICANT CHANGE UP (ref 0–1.5)
KETONES UR-MCNC: NEGATIVE — SIGNIFICANT CHANGE UP
LEUKOCYTE ESTERASE UR-ACNC: NEGATIVE — SIGNIFICANT CHANGE UP
LYMPHOCYTES # BLD AUTO: 2.09 K/UL — SIGNIFICANT CHANGE UP (ref 1–3.3)
LYMPHOCYTES # BLD AUTO: 28.8 % — SIGNIFICANT CHANGE UP (ref 13–44)
MCHC RBC-ENTMCNC: 29.5 PG — SIGNIFICANT CHANGE UP (ref 27–34)
MCHC RBC-ENTMCNC: 32.9 GM/DL — SIGNIFICANT CHANGE UP (ref 32–36)
MCV RBC AUTO: 89.8 FL — SIGNIFICANT CHANGE UP (ref 80–100)
MONOCYTES # BLD AUTO: 0.65 K/UL — SIGNIFICANT CHANGE UP (ref 0–0.9)
MONOCYTES NFR BLD AUTO: 9 % — SIGNIFICANT CHANGE UP (ref 2–14)
NEUTROPHILS # BLD AUTO: 4.31 K/UL — SIGNIFICANT CHANGE UP (ref 1.8–7.4)
NEUTROPHILS NFR BLD AUTO: 59.4 % — SIGNIFICANT CHANGE UP (ref 43–77)
NITRITE UR-MCNC: NEGATIVE — SIGNIFICANT CHANGE UP
NRBC # BLD: 0 /100 WBCS — SIGNIFICANT CHANGE UP
NRBC # FLD: 0 K/UL — SIGNIFICANT CHANGE UP
PH UR: 6 — SIGNIFICANT CHANGE UP (ref 5–8)
PLATELET # BLD AUTO: 197 K/UL — SIGNIFICANT CHANGE UP (ref 150–400)
POTASSIUM SERPL-MCNC: 3.6 MMOL/L — SIGNIFICANT CHANGE UP (ref 3.5–5.3)
POTASSIUM SERPL-SCNC: 3.6 MMOL/L — SIGNIFICANT CHANGE UP (ref 3.5–5.3)
PROT SERPL-MCNC: 8 G/DL — SIGNIFICANT CHANGE UP (ref 6–8.3)
PROT UR-MCNC: NEGATIVE — SIGNIFICANT CHANGE UP
RBC # BLD: 4.1 M/UL — SIGNIFICANT CHANGE UP (ref 3.8–5.2)
RBC # FLD: 12.7 % — SIGNIFICANT CHANGE UP (ref 10.3–14.5)
RH IG SCN BLD-IMP: POSITIVE — SIGNIFICANT CHANGE UP
RSV RNA NPH QL NAA+NON-PROBE: SIGNIFICANT CHANGE UP
SARS-COV-2 RNA SPEC QL NAA+PROBE: SIGNIFICANT CHANGE UP
SODIUM SERPL-SCNC: 139 MMOL/L — SIGNIFICANT CHANGE UP (ref 135–145)
SP GR SPEC: 1.01 — SIGNIFICANT CHANGE UP (ref 1–1.05)
UROBILINOGEN FLD QL: SIGNIFICANT CHANGE UP
WBC # BLD: 7.25 K/UL — SIGNIFICANT CHANGE UP (ref 3.8–10.5)
WBC # FLD AUTO: 7.25 K/UL — SIGNIFICANT CHANGE UP (ref 3.8–10.5)

## 2022-03-28 PROCEDURE — 76830 TRANSVAGINAL US NON-OB: CPT | Mod: 26

## 2022-03-28 PROCEDURE — 99285 EMERGENCY DEPT VISIT HI MDM: CPT

## 2022-03-28 PROCEDURE — 88305 TISSUE EXAM BY PATHOLOGIST: CPT | Mod: 26

## 2022-03-28 RX ORDER — IBUPROFEN 200 MG
600 TABLET ORAL EVERY 6 HOURS
Refills: 0 | Status: DISCONTINUED | OUTPATIENT
Start: 2022-03-28 | End: 2022-03-29

## 2022-03-28 RX ORDER — ACETAMINOPHEN 500 MG
1000 TABLET ORAL ONCE
Refills: 0 | Status: COMPLETED | OUTPATIENT
Start: 2022-03-28 | End: 2022-03-28

## 2022-03-28 RX ORDER — FENTANYL CITRATE 50 UG/ML
25 INJECTION INTRAVENOUS
Refills: 0 | Status: DISCONTINUED | OUTPATIENT
Start: 2022-03-28 | End: 2022-03-29

## 2022-03-28 RX ORDER — IBUPROFEN 200 MG
3 TABLET ORAL
Qty: 168 | Refills: 0
Start: 2022-03-28 | End: 2022-04-10

## 2022-03-28 RX ORDER — OXYCODONE HYDROCHLORIDE 5 MG/1
5 TABLET ORAL EVERY 6 HOURS
Refills: 0 | Status: DISCONTINUED | OUTPATIENT
Start: 2022-03-28 | End: 2022-03-29

## 2022-03-28 RX ORDER — ACETAMINOPHEN 500 MG
2 TABLET ORAL
Qty: 112 | Refills: 0
Start: 2022-03-28 | End: 2022-04-10

## 2022-03-28 RX ORDER — ACETAMINOPHEN 500 MG
975 TABLET ORAL EVERY 6 HOURS
Refills: 0 | Status: DISCONTINUED | OUTPATIENT
Start: 2022-03-28 | End: 2022-03-29

## 2022-03-28 RX ORDER — SODIUM CHLORIDE 9 MG/ML
1000 INJECTION, SOLUTION INTRAVENOUS ONCE
Refills: 0 | Status: COMPLETED | OUTPATIENT
Start: 2022-03-28 | End: 2022-03-28

## 2022-03-28 RX ORDER — OXYCODONE HYDROCHLORIDE 5 MG/1
1 TABLET ORAL
Qty: 8 | Refills: 0
Start: 2022-03-28 | End: 2022-03-29

## 2022-03-28 RX ADMIN — OXYCODONE HYDROCHLORIDE 5 MILLIGRAM(S): 5 TABLET ORAL at 23:04

## 2022-03-28 RX ADMIN — OXYCODONE HYDROCHLORIDE 5 MILLIGRAM(S): 5 TABLET ORAL at 22:34

## 2022-03-28 RX ADMIN — Medication 400 MILLIGRAM(S): at 17:50

## 2022-03-28 RX ADMIN — SODIUM CHLORIDE 2000 MILLILITER(S): 9 INJECTION, SOLUTION INTRAVENOUS at 22:20

## 2022-03-28 NOTE — ED ADULT NURSE NOTE - OBJECTIVE STATEMENT
31 year old female received to intake rm 12 AAOx4 ambulatory. Pt  hx of one miscarriage. Pt [facility] for r/o ectopic pregnancy. Pt c/o intermittent pain to umbilicus radiating to back. LMP 3/3. Pt with +at home pregnancy test 1 wk ago. Pt denies vaginal bleeding, cramping, headache, dizziness, cp, sob, n/v/d, fever, chills. Respirations even and unlabored. Skin intact. PIV #20 left AC, labs drawn and sent. Covid swab sent. UCG pos. VS as noted. Bed in lowest position, call bell within reach. Pt awaiting TVUS

## 2022-03-28 NOTE — ED ADULT TRIAGE NOTE - CHIEF COMPLAINT QUOTE
pt send in by [facility] for r/o ectopic pregnancy, LMP 3/3. [pt c/o lower back / pelvic pain 5/10. denies vaginal bleeding.

## 2022-03-28 NOTE — ASU PREOP CHECKLIST - PATIENT'S PERSONAL PROPERTY GIVEN TO
locker/on unit [place] Pt admitted to ASU,skin warm and dry color good.A&O x3 Dressing dry & intact.IV infusing well. No complaints of pain. 2/on unit

## 2022-03-28 NOTE — ED ADULT NURSE NOTE - NSIMPLEMENTINTERV_GEN_ALL_ED
Implemented All Universal Safety Interventions:  [place] to call system. Call bell, personal items and telephone within reach. Instruct patient to call for assistance. Room bathroom lighting operational. Non-slip footwear when patient is off stretcher. Physically safe environment: no spills, clutter or unnecessary equipment. Stretcher in lowest position, wheels locked, appropriate side rails in place.

## 2022-03-28 NOTE — ED PROVIDER NOTE - PROGRESS NOTE DETAILS
gyn admitting for further management-all results d/w patient, is aware and agreeable to plan. of note-during us, called by tech that patient was having pain and lightheaded during study-jarett draper personally went up, evaluated her and escorted her back to ed, vs repeated and good-stable, feeling better

## 2022-03-28 NOTE — H&P ADULT - ATTENDING COMMENTS
Late Entry 2/2 pt care.  Pt seen and evaluated with resident ~6:20pm.  Pt reports having more pain after the ultrasound was performed.  Pt states pain improved with IV Tylenol.  On exam at that time b/l lower quadrant tenderness to palpation, +rebound, +guarding.  Risks and benefits of surgery discussed with pt and informed consent obtained.  Informed Ed provider of plan to go to the OR emergently for concern of ruptured ectopic pregnancy.  Recommend IVF, repeat vitals [id number] until pt goes to the OR, if pt acutely becomes worse before pt is in the OR please call GYN team.  I walked to the OR  after seeing the pt and they are aware of emergent case and OR available and being set up.  Dr. Tsang GYN backup was made aware and enroute.      Nilda Tim MD

## 2022-03-28 NOTE — ASU DISCHARGE PLAN (ADULT/PEDIATRIC) - NS MD DC FALL RISK RISK
For information on Fall & Injury Prevention, visit: [email or web address] OR  [email or web address] OR  https://www.cdc.gov/steadi/patient.html

## 2022-03-28 NOTE — ED PROVIDER NOTE - PHYSICAL EXAMINATION
GEN - NAD; well appearing; A+O x3   HEAD - NC/AT   EYES- PERRL, EOMI  ENT: Airway patent, mmm, Oral cavity and pharynx normal. No inflammation, swelling, exudate, or lesions.    NECK: Neck supple  PULMONARY - CTA b/l, symmetric breath sounds.   CARDIAC -s1s2, RRR, no M,G,R  ABDOMEN - +BS, ND, NT, soft, no guarding, no rebound, no masses   BACK - no CVA tenderness, Normal  spine   EXTREMITIES - FROM  SKIN - no rash or bruising   NEUROLOGIC - alert, speech clear, no focal deficits  PSYCH -nl mood/affect, nl insight.

## 2022-03-28 NOTE — BRIEF OPERATIVE NOTE - OPERATION/FINDINGS
10-Aug-2018 11:44 EUA: normal external female genitalia, uterus mobile  Dx LSC:   -200cc of hemoperitoneum  -left tubal ectopic pregnancy appearing ruptured  -grossly normal left ovary  -right ovary with corpus luteum, grossly normal right fallopian tube  -anterior fibroid uterine  -bladder to lower anterior uterine wall adhesion  -omental to umbilical adhesion, lysed  -grossly normal upper abdomen and appendix EUA: normal external female genitalia, uterus mobile  Dx LSC:   -200cc of hemoperitoneum  -left tubal ectopic pregnancy appearing ruptured  -grossly normal left ovary  -right ovary with corpus luteum, grossly normal right fallopian tube  -anterior uterine fibroid  -bladder to lower anterior uterine wall adhesion  -omental to umbilical adhesion, lysed  -grossly normal upper abdomen and appendix

## 2022-03-28 NOTE — ASU DISCHARGE PLAN (ADULT/PEDIATRIC) - CARE PROVIDER_API CALL
Martina Kam)  Obstetrics and Gynecology  [phone] [place] [address]  Phone: [phone]  Fax: [phone]  Established Patient  Follow Up Time: 2 weeks

## 2022-03-28 NOTE — ED PROVIDER NOTE - OBJECTIVE STATEMENT
31f  (1 miscarriage), lmp approx , presents to the ED with concern for ectopic pregnancy at outpatient facility. Patient states she was having some epigastric/periumbilical abd pain, had a +home preg test, went to a walk in GYN urgent care center who did an US and had findings concerning for r. side ectopic pregnancy. Pain is improved today-still present but mild. No fevers, chills, ha, cp, sob, cough, abd pain, vomiting, diarrhea, dysuria, hematuria, vaginal bleeding.

## 2022-03-28 NOTE — BRIEF OPERATIVE NOTE - NSICDXBRIEFPROCEDURE_GEN_ALL_CORE_FT
PROCEDURES:  Laparoscopic left salpingectomy for ectopic pregnancy 28-Mar-2022 20:59:05  Andry Lerner  Evacuation of hemoperitoneum 28-Mar-2022 20:59:25  Andry Lerner

## 2022-03-28 NOTE — ED PROVIDER NOTE - CLINICAL SUMMARY MEDICAL DECISION MAKING FREE TEXT BOX
31f , lmp , positive home preg test followed by us done at gyn urgent care concerning for possible r. side ectopic pregnancy. Had upper abd pain yesterday which is improved today, appears very well, vss, abd soft/no focal tenderness, plan for labs, ua/cx, tvus to evaluate, gyn consult, reass.

## 2022-03-28 NOTE — H&P ADULT - NSHPPHYSICALEXAM_GEN_ALL_CORE
Gen: alert and oriented x 3  Cardiovascular: regular   Respiratory: breathing comfortably on RA  Abd: TTP in lower quadrants, rebound+, guarding+  Extremities: NTBL  Skin: warm and well perfused

## 2022-03-28 NOTE — ASU DISCHARGE PLAN (ADULT/PEDIATRIC) - ASU DC SPECIAL INSTRUCTIONSFT
Expect abdominal cramping/pain and spotting for the next weeks. Take ibuprofen and Tylenol for cramping.  Oxycodone for severe pain. Use a pad as needed. Call your physician or go to the emergency room if you experience any of the following: heavy vaginal bleeding (soaking more than 2 pads in 1 hour for 2 hours), fever, chills, nausea, vomiting, or pain that is not controlled by medication.

## 2022-03-28 NOTE — ED ADULT NURSE REASSESSMENT NOTE - NS ED NURSE REASSESS COMMENT FT1
pt is A&Ox4. NAD. pt states she is having 5/10 abd pain. medication given as ordered. safety precautions maintained.

## 2022-03-28 NOTE — CHART NOTE - NSCHARTNOTEFT_GEN_A_CORE
R2 OBGYN POST-OP CHECK    S: Patient seen and evaluated at bedside.  Pt awake and alert resting comfortably in bed.   Patient reports pain controlled with analgesia. Pt denies N/V, SOB, CP, palpitations, fever/chills. Not yet trialed on PO.  Not OOB yet.    O:   T(C): 36.9 (03-28-22 @ 20:50), Max: 36.9 (03-28-22 @ 20:50)  HR: 88 (03-28-22 @ 22:15) (72 - 90)  BP: 87/52 (03-28-22 @ 22:00) (87/52 - 110/55)  RR: 18 (03-28-22 @ 22:15) (13 - 18)  SpO2: 96% (03-28-22 @ 22:15) (95% - 99%)  Wt(kg): --  I&O's Summary      Gen: Resting comfortably in bed, NAD  CV: S1S2, RRR  Lungs: CTA B/L  Abd: soft, appropriately tender, occasional BS x 4 quadrants.    Inc: Clean/dry/intact port sites   Ext: SCD's in place and functional, non-tender b/l, no edema        A/P: 31y Female s/p dx lsc, LS, evacuation of 200cc of hemoperitoneum. Blood pressures soft, 90s/60s while at bedside. Patient asymptomatic. Will give liter bolus and continue monitoring BPs while in PACU.     Neuro: PO Analgesia PRN   CV: Hemodynamically stable.  Monitor VS. 1L LR bolus now.    Pulm: Saturating well on room air.  Encourage OOB and incentive spirometer use.   GI: Advance to regular diet. Anti-emetics PRN.  : DTV by 4a   FEN: Electrolytes: LR@125cc/hr.   Heme: DVT ppx w/ SCD's while in bed. Early ambulation, initially with assistance then as tolerated.   ID: Afebrile  Endo: No active issues   Dispo: Discharge from PACU when criteria met.     Yasmin, PGY2

## 2022-03-28 NOTE — H&P ADULT - HISTORY OF PRESENT ILLNESS
31y  LMP 2/5 p/w UPT+ 2 weeks ago and abdominal pain since yesterday morning.  Patient went to GYN clinic this morning and they sent her to ED with concern for ectopic pregnancy on ultrasound.  Patient reports pain 10/10.  Mild nausea, no vomiting.  Denies PO intake since this AM.  Unplanned pregnancy.    OB/GYN HISTORY:   pLTCS 2/ cat 2 ()  SAB x1    Name of GYN Physician: default

## 2022-03-28 NOTE — H&P ADULT - ASSESSMENT
31y  LMP 2/5 p/w UPT+ 2 weeks ago and significant abdominal pain.  Left sided ectopic on US.  Peritoneal on physical exam.  Concern for ruptured ectopic pregnancy.  VSS.    Plan:  -added on to OR for emergent surgery  -consented for Dx LSC, possible salpingectomy/oophorectomy/ex-lap, exam under anesthesia  -NPO  -IVF    Pt seen w/ Dr. Tim, d/w Dr. Trinh Lerner, PGY3

## 2022-03-28 NOTE — ED PROVIDER NOTE - NS ED ATTENDING STATEMENT MOD
This was a shared visit with the RAUL. I reviewed and verified the documentation and independently performed the documented:

## 2022-03-28 NOTE — H&P ADULT - NSHPLABSRESULTS_GEN_ALL_CORE
LABS:                        12.1   7.25  )-----------( 197      ( 28 Mar 2022 16:22 )             36.8         139  |  102  |  9   ----------------------------<  101<H>  3.6   |  25  |  0.84    Ca    9.5      28 Mar 2022 16:21    TPro  8.0  /  Alb  4.7  /  TBili  <0.2  /  DBili  x   /  AST  16  /  ALT  13  /  AlkPhos  71        Urinalysis Basic - ( 28 Mar 2022 16:22 )    Color: Light Yellow / Appearance: Clear / S.012 / pH: x  Gluc: x / Ketone: Negative  / Bili: Negative / Urobili: <2 mg/dL   Blood: x / Protein: Negative / Nitrite: Negative       Leuk Esterase: Negative / RBC: x / WBC x   Sq Epi: x / Non Sq Epi: x / Bacteria: x    HCG Quantitative, Serum (22 @ 16:21)   HCG Quantitative, Serum: 39107.0    < from: US Transvaginal (22 @ 17:21) >      ACC: [id number] EXAM:  US TRANSVAGINAL                          PROCEDURE DATE:  2022          INTERPRETATION:  CLINICAL INFORMATION: Concern for ectopic pregnancy on   outside imaging    LMP: 2022  Estimated gestational age by LMP: 4 weeks, 2 days    COMPARISON: None available.    TECHNIQUE:  Endovaginal pelvic sonogram only. Color and Spectral Doppler was   performed.    FINDINGS:    Uterus: 7.8 x 5.4 x 5.1 cm.  Endometrium: Endometrial stripe measures 0.6 cm. Collection of fluid   within the endometrial cavity consistent with pseudosac.    Right ovary: 3.8 x 3.0 x 3.6 cm. Complex ovarian cyst measuring 2.9 x 1.6   x 2.2 cm. There is flow to the right ovary.  Left ovary: 3.2 x 2.3 x 2.5 cm. Ectopic pregnancy adjacent to the left   ovary measuring 2.0 x 2.1 x 2.4 cm.There is flow to the left ovary.    Fluid: Trace simple fluid in the cul-de-sac and left adnexa.    IMPRESSION:  Left tubal ectopic pregnancy.    Complex right ovarian cyst measuring 2.9 x 1.6 x 2.2 cm.    Findingswere discussed by Dr. Jacobson with MD Kajal on 3/28/2022   5:32 PM with read back confirmation.    --- End of Report ---          BRITTNEY JACOBSON MD; Resident Radiologist  This document has been electronically signed.  DUSTIN RICHARDSON MD; Attending Radiologist  This document has been electronically signed. Mar 28 2022  6:33PM    < end of copied text >

## 2022-03-28 NOTE — ED PROVIDER NOTE - NS ED ROS FT
ROS:  GENERAL: No fever, no chills  EYES: no change in vision  HEENT: no trouble swallowing, no trouble speaking  CARDIAC: no chest pain  PULMONARY: no cough, no shortness of breath  GI: + abdominal pain, no nausea, no vomiting, no diarrhea, no constipation  : No dysuria, no frequency, no change in appearance, or odor of urine  SKIN: no rashes  NEURO: no headache, no weakness  MSK: No joint pain

## 2022-03-29 VITALS
RESPIRATION RATE: 18 BRPM | HEART RATE: 70 BPM | OXYGEN SATURATION: 96 % | SYSTOLIC BLOOD PRESSURE: 106 MMHG | TEMPERATURE: 99 F | DIASTOLIC BLOOD PRESSURE: 57 MMHG

## 2022-03-29 LAB
CULTURE RESULTS: SIGNIFICANT CHANGE UP
SPECIMEN SOURCE: SIGNIFICANT CHANGE UP

## 2022-04-03 LAB — SURGICAL PATHOLOGY STUDY: SIGNIFICANT CHANGE UP

## 2022-04-11 ENCOUNTER — OUTPATIENT (OUTPATIENT)
Dept: OUTPATIENT SERVICES | Facility: HOSPITAL | Age: 32
LOS: 1 days | End: 2022-04-11

## 2022-04-11 ENCOUNTER — APPOINTMENT (OUTPATIENT)
Dept: OBGYN | Facility: HOSPITAL | Age: 32
End: 2022-04-11
Payer: COMMERCIAL

## 2022-04-11 VITALS
SYSTOLIC BLOOD PRESSURE: 118 MMHG | HEIGHT: 65 IN | HEART RATE: 70 BPM | BODY MASS INDEX: 25.66 KG/M2 | WEIGHT: 154 LBS | DIASTOLIC BLOOD PRESSURE: 45 MMHG | TEMPERATURE: 98.2 F

## 2022-04-11 DIAGNOSIS — Z98.891 HISTORY OF UTERINE SCAR FROM PREVIOUS SURGERY: Chronic | ICD-10-CM

## 2022-04-11 PROCEDURE — ZZZZZ: CPT

## 2022-04-12 DIAGNOSIS — Z09 ENCOUNTER FOR FOLLOW-UP EXAMINATION AFTER COMPLETED TREATMENT FOR CONDITIONS OTHER THAN MALIGNANT NEOPLASM: ICD-10-CM

## 2022-04-12 DIAGNOSIS — O00.90 UNSPECIFIED ECTOPIC PREGNANCY WITHOUT INTRAUTERINE PREGNANCY: ICD-10-CM

## 2022-06-28 NOTE — OB PROVIDER LABOR PROGRESS NOTE - NS_DILATION_OBGYN_ALL_OB_NU
4 Render Risk Assessment In Note?: yes Additional Notes: Recommended a plastic surgeon to treat long term\\nFollow up in two weeks Detail Level: Detailed

## 2022-07-08 NOTE — OB PROVIDER H&P - LIVING CHILDREN, OB PROFILE
0

## 2022-07-11 ENCOUNTER — APPOINTMENT (OUTPATIENT)
Dept: ANTEPARTUM | Facility: CLINIC | Age: 32
End: 2022-07-11

## 2022-07-11 ENCOUNTER — ASOB RESULT (OUTPATIENT)
Age: 32
End: 2022-07-11

## 2022-07-11 PROCEDURE — 76801 OB US < 14 WKS SINGLE FETUS: CPT

## 2022-07-11 PROCEDURE — 76813 OB US NUCHAL MEAS 1 GEST: CPT | Mod: 59

## 2022-07-11 PROCEDURE — 36416 COLLJ CAPILLARY BLOOD SPEC: CPT

## 2022-08-25 ENCOUNTER — LABORATORY RESULT (OUTPATIENT)
Age: 32
End: 2022-08-25

## 2022-08-25 ENCOUNTER — ASOB RESULT (OUTPATIENT)
Age: 32
End: 2022-08-25

## 2022-08-25 ENCOUNTER — APPOINTMENT (OUTPATIENT)
Dept: ANTEPARTUM | Facility: CLINIC | Age: 32
End: 2022-08-25

## 2022-08-25 PROCEDURE — 99212 OFFICE O/P EST SF 10 MIN: CPT | Mod: 25

## 2022-08-25 PROCEDURE — 76811 OB US DETAILED SNGL FETUS: CPT

## 2022-09-08 ENCOUNTER — NON-APPOINTMENT (OUTPATIENT)
Age: 32
End: 2022-09-08

## 2022-10-06 ENCOUNTER — ASOB RESULT (OUTPATIENT)
Age: 32
End: 2022-10-06

## 2022-10-06 ENCOUNTER — APPOINTMENT (OUTPATIENT)
Dept: ANTEPARTUM | Facility: CLINIC | Age: 32
End: 2022-10-06

## 2022-10-06 PROCEDURE — 76816 OB US FOLLOW-UP PER FETUS: CPT

## 2022-11-23 NOTE — BRIEF OPERATIVE NOTE - NSICDXBRIEFPOSTOP_GEN_ALL_CORE_FT
Clinician called to remind pt of their appointment. Left VM with [facility]  number.     Pt's Mom was provided psycho education regarding the efficacy of therapy as it relates to regular attendance. Mom was prompted to identify barriers to care if present so they may be addressed. Clinician informed mom that regular attendance is required in order to receive clinical and therapeutic benefit from services. Mom was informed of [facility] no-show policy.    POST-OP DIAGNOSIS:  Ruptured ectopic pregnancy 28-Mar-2022 21:00:38  Andry Lerner

## 2022-12-23 ENCOUNTER — OUTPATIENT (OUTPATIENT)
Dept: OUTPATIENT SERVICES | Facility: HOSPITAL | Age: 32
LOS: 1 days | End: 2022-12-23

## 2022-12-23 VITALS
TEMPERATURE: 98 F | RESPIRATION RATE: 16 BRPM | DIASTOLIC BLOOD PRESSURE: 60 MMHG | OXYGEN SATURATION: 99 % | HEART RATE: 92 BPM | WEIGHT: 175.05 LBS | SYSTOLIC BLOOD PRESSURE: 100 MMHG | HEIGHT: 62 IN

## 2022-12-23 DIAGNOSIS — Z98.891 HISTORY OF UTERINE SCAR FROM PREVIOUS SURGERY: Chronic | ICD-10-CM

## 2022-12-23 DIAGNOSIS — O34.211 MATERNAL CARE FOR LOW TRANSVERSE SCAR FROM PREVIOUS CESAREAN DELIVERY: ICD-10-CM

## 2022-12-23 LAB
APPEARANCE UR: CLEAR — SIGNIFICANT CHANGE UP
BILIRUB UR-MCNC: NEGATIVE — SIGNIFICANT CHANGE UP
BLD GP AB SCN SERPL QL: NEGATIVE — SIGNIFICANT CHANGE UP
COLOR SPEC: SIGNIFICANT CHANGE UP
DIFF PNL FLD: NEGATIVE — SIGNIFICANT CHANGE UP
GLUCOSE UR QL: NEGATIVE — SIGNIFICANT CHANGE UP
HCT VFR BLD CALC: 33.5 % — LOW (ref 34.5–45)
HGB BLD-MCNC: 11.1 G/DL — LOW (ref 11.5–15.5)
KETONES UR-MCNC: NEGATIVE — SIGNIFICANT CHANGE UP
LEUKOCYTE ESTERASE UR-ACNC: NEGATIVE — SIGNIFICANT CHANGE UP
MCHC RBC-ENTMCNC: 30.3 PG — SIGNIFICANT CHANGE UP (ref 27–34)
MCHC RBC-ENTMCNC: 33.1 GM/DL — SIGNIFICANT CHANGE UP (ref 32–36)
MCV RBC AUTO: 91.5 FL — SIGNIFICANT CHANGE UP (ref 80–100)
NITRITE UR-MCNC: NEGATIVE — SIGNIFICANT CHANGE UP
NRBC # BLD: 0 /100 WBCS — SIGNIFICANT CHANGE UP (ref 0–0)
NRBC # FLD: 0 K/UL — SIGNIFICANT CHANGE UP (ref 0–0)
PH UR: 6.5 — SIGNIFICANT CHANGE UP (ref 5–8)
PLATELET # BLD AUTO: 140 K/UL — LOW (ref 150–400)
PROT UR-MCNC: NEGATIVE — SIGNIFICANT CHANGE UP
RBC # BLD: 3.66 M/UL — LOW (ref 3.8–5.2)
RBC # FLD: 13.8 % — SIGNIFICANT CHANGE UP (ref 10.3–14.5)
RH IG SCN BLD-IMP: POSITIVE — SIGNIFICANT CHANGE UP
SP GR SPEC: 1.01 — SIGNIFICANT CHANGE UP (ref 1.01–1.05)
UROBILINOGEN FLD QL: SIGNIFICANT CHANGE UP
WBC # BLD: 9.15 K/UL — SIGNIFICANT CHANGE UP (ref 3.8–10.5)
WBC # FLD AUTO: 9.15 K/UL — SIGNIFICANT CHANGE UP (ref 3.8–10.5)

## 2022-12-23 RX ORDER — OXYTOCIN 10 UNIT/ML
333.33 VIAL (ML) INJECTION
Qty: 20 | Refills: 0 | Status: DISCONTINUED | OUTPATIENT
Start: 2023-01-11 | End: 2023-01-11

## 2022-12-23 RX ORDER — FERROUS SULFATE 325(65) MG
1 TABLET ORAL
Qty: 0 | Refills: 0 | DISCHARGE

## 2022-12-23 RX ORDER — SODIUM CHLORIDE 9 MG/ML
1000 INJECTION, SOLUTION INTRAVENOUS
Refills: 0 | Status: DISCONTINUED | OUTPATIENT
Start: 2023-01-11 | End: 2023-01-11

## 2022-12-23 NOTE — OB PST NOTE - NS_OBGYNHISTORY_OBGYN_ALL_OB_FT
pt reports small uterine fibroids  h/o emergency  in  pt reports small uterine fibroids  h/o emergency  in  at 39 weeks

## 2022-12-23 NOTE — OB PST NOTE - NSICDXPASTMEDICALHX_GEN_ALL_CORE_FT
PAST MEDICAL HISTORY:  H/O sickle cell trait     Maternal care for low transverse scar from previous  delivery     Spontaneous  2020

## 2022-12-23 NOTE — OB PST NOTE - NSANTHOSAYNRD_GEN_A_CORE
No. YONATAN screening performed.  STOP BANG Legend: 0-2 = LOW Risk; 3-4 = INTERMEDIATE Risk; 5-8 = HIGH Risk

## 2022-12-23 NOTE — OB PST NOTE - NSHPPHYSICALEXAM_GEN_ALL_CORE
Constitutional: Well Developed, Well Groomed, Well Nourished, No Distress    Eyes: PERRL, EOMI, conjunctiva clear    Ears: Normal    Mouth & Gums: Normal, moist    Pharynx: No tenderness, discharge, or peritonsillar abscess    Tonsils: No Redness, discharge, tenderness, or swelling    Neck: Supple, no JVD, normal thyroid glands, no carotid bruits, no cervical vertebral or paraspinal tenderness    Breast: Normal shape    Back: Normal shape, ROM intact, strength intact, no vertebral tenderness    Respiratory: Airway patent, breath sounds equal, good air movement, respiration non-labored, clear to auscultation bilateral, no chest wall tenderness, no intercostal retractions, no rales, no wheezes, no rhonchi, no subcutaneous emphysema    Cardiovascular:  Regular rate and rhythm, no rubs or murmur, normal PMI    Gastrointestinal: pregnant abdomen. pt reports fetal movement at PST visit     Extremities: No clubbing, cyanosis, or pedal edema    Vascular:  Carotid Pulse normal , Radial Pulse normal    Neurological: alert & oriented x 3, sensation intact, deep reflexes intact, cranial nerve intact, normal strength    Skin: warm and dry, normal color    Lymph Nodes: normal posterior cervical lymph node, normal anterior cervical lymph node, normal supraclavicular lymph node    Musculoskeletal: ROM intact, no joint swelling, warmth, or calf tenderness. Normal strength    Psychiatric: normal affect, normal behavior Constitutional: Well Developed, Well Groomed, Well Nourished, No Distress    Eyes: PERRL, EOMI, conjunctiva clear    Ears: Normal    Mouth & Gums: Normal, moist    Pharynx: No tenderness, discharge, or peritonsillar abscess    Tonsils: No Redness, discharge, tenderness, or swelling    Neck: Supple, no JVD, normal thyroid glands, no carotid bruits, no cervical vertebral or paraspinal tenderness    Breast: Normal shape    Back: Normal shape, ROM intact, strength intact, no vertebral tenderness    Respiratory: Airway patent, breath sounds equal, good air movement, respiration non-labored, clear to auscultation bilateral, no chest wall tenderness, no intercostal retractions, no rales, no wheezes, no rhonchi, no subcutaneous emphysema    Cardiovascular:  Regular rate and rhythm, no rubs or murmur, normal PMI    Gastrointestinal: pregnant abdomen. pt reports fetal movement today    Extremities: No clubbing, cyanosis, or pedal edema    Vascular:  Carotid Pulse normal , Radial Pulse normal    Neurological: alert & oriented x 3, sensation intact, deep reflexes intact, cranial nerve intact, normal strength    Skin: warm and dry, normal color    Lymph Nodes: normal posterior cervical lymph node, normal anterior cervical lymph node, normal supraclavicular lymph node    Musculoskeletal: ROM intact, no joint swelling, warmth, or calf tenderness. Normal strength    Psychiatric: normal affect, normal behavior

## 2022-12-23 NOTE — OB PST NOTE - NSHPREVIEWOFSYSTEMS_GEN_ALL_CORE
General: pregnancy weight gain. No fever, chills, sweating, anorexia, weight loss or weight gain. No polyphagia, polyuria, polydipsia, malaise, or fatigue    Skin: No rashes, itching, or dryness. No change in size/color of moles. No tumors, brittle nails, pitted nails, or hair loss    Breast: No tenderness, lumps, or nipple discharge      Ophthalmologic: No diplopia, photophobia, lacrimation, blurred Vision , or eye discharge    ENMT Symptoms: No hearing difficulty, ear pain, tinnitus, or vertigo. No sinus symptoms, nasal congestion, nasal   discharge, or nasal obstruction    Respiratory and Thorax: No wheezing, dyspnea, cough, hemoptysis, or pleuritic chest pain     Cardiovascular: No chest pain, palpitations, dyspnea on exertion, orthopnea, paroxysmal nocturnal dyspnea,   peripheral edema, or claudication    Gastrointestinal: No nausea, vomiting, diarrhea, constipation, change in bowel habits, flatulence, abdominal pain, or melena    Genitourinary/ Pelvis: No hematuria, renal colic, or flank pain.  No urine discoloration, incontinence, dysuria, or urinary hesitancy. Normal urinary frequency. No nocturia, abnormal vaginal bleeding, vaginal discharge, spotting, pelvic pain, or vaginal leakage    Musculoskeletal: No arthralgia, arthritis, joint swelling, muscle cramping, muscle weakness, neck pain, arm pain, or leg pain    Neurological: No transient paralysis, weakness, paresthesias, or seizures. No syncope, tremors, vertigo, loss of sensation, difficulty walking, loss of consciousness, hemiparesis, confusion, or facial palsy    Psychiatric: No suicidal ideation, depression, anxiety, insomnia, memory loss, paranoia, mood swings, agitation, hallucinations, or hyperactivity    Hematology: anemia in pregnancy. No gum bleeding, nose bleeding, or skin lumps    Lymphatic: No enlarged or tender lymph nodes. No extremity swelling    Endocrine: No heat or cold intolerance    Immunologic: No recurrent or persistent infections General: pregnancy weight gain. No fever, chills, sweating, anorexia, weight loss. No polyphagia, polyuria, polydipsia, malaise, or fatigue    Skin: No rashes, itching, or dryness. No change in size/color of moles. No tumors, brittle nails, pitted nails, or hair loss    Breast: No tenderness, lumps, or nipple discharge      Ophthalmologic: No diplopia, photophobia, lacrimation, blurred Vision , or eye discharge    ENMT Symptoms: No hearing difficulty, ear pain, tinnitus, or vertigo. No sinus symptoms, nasal congestion, nasal discharge, or nasal obstruction    Respiratory and Thorax: No wheezing, dyspnea, cough, hemoptysis, or pleuritic chest pain     Cardiovascular: No chest pain, palpitations, dyspnea on exertion, orthopnea, paroxysmal nocturnal dyspnea, peripheral edema, or claudication    Gastrointestinal: No nausea, vomiting, diarrhea, constipation, change in bowel habits, flatulence, abdominal pain, or melena    Genitourinary/ Pelvis: No hematuria, renal colic, or flank pain.  No urine discoloration, incontinence, dysuria, or urinary hesitancy. Normal urinary frequency. No nocturia, abnormal vaginal bleeding, vaginal discharge, spotting, pelvic pain, or vaginal leakage    Musculoskeletal: No arthralgia, arthritis, joint swelling, muscle cramping, muscle weakness, neck pain, arm pain, or leg pain    Neurological: No transient paralysis, weakness, paresthesias, or seizures. No syncope, tremors, vertigo, loss of sensation, difficulty walking, loss of consciousness, hemiparesis, confusion, or facial palsy    Psychiatric: No suicidal ideation, depression, anxiety, insomnia, memory loss, paranoia, mood swings, agitation, hallucinations, or hyperactivity    Hematology: anemia in pregnancy. No gum bleeding, nose bleeding, or skin lumps    Lymphatic: No enlarged or tender lymph nodes. No extremity swelling    Endocrine: No heat or cold intolerance    Immunologic: No recurrent or persistent infections

## 2022-12-23 NOTE — OB PST NOTE - PROBLEM SELECTOR PLAN 1
preop for repeat  section on 22  preop instructions given, pt verbalized understanding  chlorhexidine wash provided  pt informed COVID testing must be done 72 hours prior to surgery  cbc, UA, type pending

## 2022-12-23 NOTE — OB PST NOTE - ASSESSMENT
33 y/o female presents to PST preop for repeat  section. Pt had emergency  section in  at 39 weeks due to non progressive labor.

## 2023-01-09 LAB — SARS-COV-2 RNA SPEC QL NAA+PROBE: SIGNIFICANT CHANGE UP

## 2023-01-10 ENCOUNTER — TRANSCRIPTION ENCOUNTER (OUTPATIENT)
Age: 33
End: 2023-01-10

## 2023-01-11 ENCOUNTER — INPATIENT (INPATIENT)
Facility: HOSPITAL | Age: 33
LOS: 1 days | Discharge: HOME | End: 2023-01-13
Attending: OBSTETRICS & GYNECOLOGY | Admitting: OBSTETRICS & GYNECOLOGY

## 2023-01-11 VITALS
WEIGHT: 177.91 LBS | HEART RATE: 67 BPM | SYSTOLIC BLOOD PRESSURE: 113 MMHG | HEIGHT: 62 IN | TEMPERATURE: 99 F | RESPIRATION RATE: 16 BRPM | DIASTOLIC BLOOD PRESSURE: 67 MMHG

## 2023-01-11 DIAGNOSIS — O34.211 MATERNAL CARE FOR LOW TRANSVERSE SCAR FROM PREVIOUS CESAREAN DELIVERY: ICD-10-CM

## 2023-01-11 DIAGNOSIS — Z98.891 HISTORY OF UTERINE SCAR FROM PREVIOUS SURGERY: Chronic | ICD-10-CM

## 2023-01-11 LAB
APTT BLD: 26.9 SEC — LOW (ref 27–36.3)
BASOPHILS # BLD AUTO: 0.03 K/UL — SIGNIFICANT CHANGE UP (ref 0–0.2)
BASOPHILS NFR BLD AUTO: 0.4 % — SIGNIFICANT CHANGE UP (ref 0–2)
BLD GP AB SCN SERPL QL: NEGATIVE — SIGNIFICANT CHANGE UP
COVID-19 SPIKE DOMAIN AB INTERP: POSITIVE
COVID-19 SPIKE DOMAIN ANTIBODY RESULT: >250 U/ML — HIGH
EOSINOPHIL # BLD AUTO: 0.04 K/UL — SIGNIFICANT CHANGE UP (ref 0–0.5)
EOSINOPHIL NFR BLD AUTO: 0.5 % — SIGNIFICANT CHANGE UP (ref 0–6)
FIBRINOGEN PPP-MCNC: 543 MG/DL — HIGH (ref 200–465)
HCT VFR BLD CALC: 38.2 % — SIGNIFICANT CHANGE UP (ref 34.5–45)
HGB BLD-MCNC: 12.9 G/DL — SIGNIFICANT CHANGE UP (ref 11.5–15.5)
IANC: 5.6 K/UL — SIGNIFICANT CHANGE UP (ref 1.8–7.4)
IMM GRANULOCYTES NFR BLD AUTO: 0.5 % — SIGNIFICANT CHANGE UP (ref 0–0.9)
INR BLD: 0.97 RATIO — SIGNIFICANT CHANGE UP (ref 0.88–1.16)
LYMPHOCYTES # BLD AUTO: 1.98 K/UL — SIGNIFICANT CHANGE UP (ref 1–3.3)
LYMPHOCYTES # BLD AUTO: 23.6 % — SIGNIFICANT CHANGE UP (ref 13–44)
MCHC RBC-ENTMCNC: 29.9 PG — SIGNIFICANT CHANGE UP (ref 27–34)
MCHC RBC-ENTMCNC: 33.8 GM/DL — SIGNIFICANT CHANGE UP (ref 32–36)
MCV RBC AUTO: 88.6 FL — SIGNIFICANT CHANGE UP (ref 80–100)
MONOCYTES # BLD AUTO: 0.7 K/UL — SIGNIFICANT CHANGE UP (ref 0–0.9)
MONOCYTES NFR BLD AUTO: 8.3 % — SIGNIFICANT CHANGE UP (ref 2–14)
NEUTROPHILS # BLD AUTO: 5.6 K/UL — SIGNIFICANT CHANGE UP (ref 1.8–7.4)
NEUTROPHILS NFR BLD AUTO: 66.7 % — SIGNIFICANT CHANGE UP (ref 43–77)
NRBC # BLD: 0 /100 WBCS — SIGNIFICANT CHANGE UP (ref 0–0)
NRBC # FLD: 0 K/UL — SIGNIFICANT CHANGE UP (ref 0–0)
PLATELET # BLD AUTO: 131 K/UL — LOW (ref 150–400)
PROTHROM AB SERPL-ACNC: 11.2 SEC — SIGNIFICANT CHANGE UP (ref 10.5–13.4)
RBC # BLD: 4.31 M/UL — SIGNIFICANT CHANGE UP (ref 3.8–5.2)
RBC # FLD: 13.6 % — SIGNIFICANT CHANGE UP (ref 10.3–14.5)
RH IG SCN BLD-IMP: POSITIVE — SIGNIFICANT CHANGE UP
SARS-COV-2 IGG+IGM SERPL QL IA: >250 U/ML — HIGH
SARS-COV-2 IGG+IGM SERPL QL IA: POSITIVE
T PALLIDUM AB TITR SER: NEGATIVE — SIGNIFICANT CHANGE UP
WBC # BLD: 8.39 K/UL — SIGNIFICANT CHANGE UP (ref 3.8–10.5)
WBC # FLD AUTO: 8.39 K/UL — SIGNIFICANT CHANGE UP (ref 3.8–10.5)

## 2023-01-11 RX ORDER — SODIUM CHLORIDE 9 MG/ML
1000 INJECTION, SOLUTION INTRAVENOUS
Refills: 0 | Status: DISCONTINUED | OUTPATIENT
Start: 2023-01-11 | End: 2023-01-11

## 2023-01-11 RX ORDER — FAMOTIDINE 10 MG/ML
20 INJECTION INTRAVENOUS ONCE
Refills: 0 | Status: COMPLETED | OUTPATIENT
Start: 2023-01-11 | End: 2023-01-11

## 2023-01-11 RX ORDER — OXYCODONE HYDROCHLORIDE 5 MG/1
5 TABLET ORAL
Refills: 0 | Status: DISCONTINUED | OUTPATIENT
Start: 2023-01-11 | End: 2023-01-13

## 2023-01-11 RX ORDER — TETANUS TOXOID, REDUCED DIPHTHERIA TOXOID AND ACELLULAR PERTUSSIS VACCINE, ADSORBED 5; 2.5; 8; 8; 2.5 [IU]/.5ML; [IU]/.5ML; UG/.5ML; UG/.5ML; UG/.5ML
0.5 SUSPENSION INTRAMUSCULAR ONCE
Refills: 0 | Status: DISCONTINUED | OUTPATIENT
Start: 2023-01-11 | End: 2023-01-13

## 2023-01-11 RX ORDER — ACETAMINOPHEN 500 MG
975 TABLET ORAL
Refills: 0 | Status: DISCONTINUED | OUTPATIENT
Start: 2023-01-11 | End: 2023-01-13

## 2023-01-11 RX ORDER — DIPHENHYDRAMINE HCL 50 MG
25 CAPSULE ORAL EVERY 6 HOURS
Refills: 0 | Status: DISCONTINUED | OUTPATIENT
Start: 2023-01-11 | End: 2023-01-13

## 2023-01-11 RX ORDER — OXYCODONE HYDROCHLORIDE 5 MG/1
5 TABLET ORAL ONCE
Refills: 0 | Status: DISCONTINUED | OUTPATIENT
Start: 2023-01-11 | End: 2023-01-13

## 2023-01-11 RX ORDER — HEPARIN SODIUM 5000 [USP'U]/ML
5000 INJECTION INTRAVENOUS; SUBCUTANEOUS EVERY 12 HOURS
Refills: 0 | Status: DISCONTINUED | OUTPATIENT
Start: 2023-01-11 | End: 2023-01-13

## 2023-01-11 RX ORDER — IBUPROFEN 200 MG
600 TABLET ORAL EVERY 6 HOURS
Refills: 0 | Status: COMPLETED | OUTPATIENT
Start: 2023-01-11 | End: 2023-12-10

## 2023-01-11 RX ORDER — CITRIC ACID/SODIUM CITRATE 300-500 MG
30 SOLUTION, ORAL ORAL ONCE
Refills: 0 | Status: COMPLETED | OUTPATIENT
Start: 2023-01-11 | End: 2023-01-11

## 2023-01-11 RX ORDER — MAGNESIUM HYDROXIDE 400 MG/1
30 TABLET, CHEWABLE ORAL
Refills: 0 | Status: DISCONTINUED | OUTPATIENT
Start: 2023-01-11 | End: 2023-01-13

## 2023-01-11 RX ORDER — SIMETHICONE 80 MG/1
80 TABLET, CHEWABLE ORAL EVERY 4 HOURS
Refills: 0 | Status: DISCONTINUED | OUTPATIENT
Start: 2023-01-11 | End: 2023-01-13

## 2023-01-11 RX ORDER — KETOROLAC TROMETHAMINE 30 MG/ML
30 SYRINGE (ML) INJECTION EVERY 6 HOURS
Refills: 0 | Status: DISCONTINUED | OUTPATIENT
Start: 2023-01-11 | End: 2023-01-12

## 2023-01-11 RX ORDER — OXYTOCIN 10 UNIT/ML
333.33 VIAL (ML) INJECTION
Qty: 20 | Refills: 0 | Status: DISCONTINUED | OUTPATIENT
Start: 2023-01-11 | End: 2023-01-11

## 2023-01-11 RX ORDER — SODIUM CHLORIDE 9 MG/ML
1000 INJECTION, SOLUTION INTRAVENOUS ONCE
Refills: 0 | Status: COMPLETED | OUTPATIENT
Start: 2023-01-11 | End: 2023-01-11

## 2023-01-11 RX ORDER — LANOLIN
1 OINTMENT (GRAM) TOPICAL EVERY 6 HOURS
Refills: 0 | Status: DISCONTINUED | OUTPATIENT
Start: 2023-01-11 | End: 2023-01-13

## 2023-01-11 RX ADMIN — Medication 1000 MILLIUNIT(S)/MIN: at 18:45

## 2023-01-11 RX ADMIN — FAMOTIDINE 20 MILLIGRAM(S): 10 INJECTION INTRAVENOUS at 13:45

## 2023-01-11 RX ADMIN — Medication 975 MILLIGRAM(S): at 19:38

## 2023-01-11 RX ADMIN — SODIUM CHLORIDE 2000 MILLILITER(S): 9 INJECTION, SOLUTION INTRAVENOUS at 13:10

## 2023-01-11 RX ADMIN — Medication 1000 MILLIUNIT(S)/MIN: at 19:39

## 2023-01-11 RX ADMIN — Medication 30 MILLILITER(S): at 13:50

## 2023-01-11 NOTE — OB RN PREOPERATIVE CHECKLIST - VIA
What Type Of Note Output Would You Prefer (Optional)?: Standard Output Hpi Title: Evaluation of Skin Lesions How Severe Are Your Spot(S)?: mild Have Your Spot(S) Been Treated In The Past?: has not been treated ambulate

## 2023-01-11 NOTE — OB RN DELIVERY SUMMARY - NSSELHIDDEN_OBGYN_ALL_OB_FT
[NS_DeliveryAttending1_OBGYN_ALL_OB_FT:[id number]],[NS_DeliveryRN_OBGYN_ALL_OB_FT:[id number]=]

## 2023-01-11 NOTE — OB RN PATIENT PROFILE - HAS THE PATIENT RECEIVED THE INFLUENZA VACCINE THIS SEASON?
Diabetes education:Pt is newly dx with diabetes. Met with pt this afternoon. Pt was admitted with blood sugar of 227 and Hg a1c of 8.6%. Pt is currently getting Lantus 8 units pm with Humalog sliding scale coverage every six hours (please change to ac and hs if eating). Current blood sugars are 218 (2 units) and 260  ( 3 units). Asked pt to give his insulin with nursing and practice his finger sticks ( stick only).  Discussed what diabetes was, type two, hypoglycemia,  hyperglycemia,  goals for blood sugars. Discussed need for carbohydrates and proteins, with every meal and snack as well as why. Discussed the importance of the HS snack with a carbohydrate and protein. Discussed need, benefit and precautions with exercise.  Discussed  what effects blood sugars. Discussed need to follow up with PCP.  Insulin was discussed briefly.  Plan: CDE will follow up on Monday as not sure what he will need at discharge. Meter would be True Metrix and insulin if needed would be Admelog vial or pen and Basaglar kwik pen as well as pen needles.   yes...

## 2023-01-11 NOTE — OB PROVIDER H&P - NS_OBGYNHISTORY_OBGYN_ALL_OB_FT
:  SAB (no D&c)  G2: 2021, emergent c/s, NRFHT, 38w, 3250g, Male  G3: 3/21/22, ruptured L tube ectopic (s/p salpingectomy)  G4: current    Gyn Hx: admits fibroid & h/o ovarian cysts, Denies abnormal pap smears    Sono 2022:    Site                 L(cm)     W(cm)     D(cm)     Location   Fundal               3.06      2.42      1.77      Subserosal    *no ovarian cysts seen

## 2023-01-11 NOTE — OB RN DELIVERY SUMMARY - NS_SKINTOSKINA_OBGYN_ALL_OB
Was done for at least one hour Cephalexin Pregnancy And Lactation Text: This medication is Pregnancy Category B and considered safe during pregnancy.  It is also excreted in breast milk but can be used safely for shorter doses.

## 2023-01-11 NOTE — OB PROVIDER H&P - PRO BLOOD TYPE INFANT
PROCEDURE NOTE: Eylea 2mg (2 of 2) #13 OS. Diagnosis: Neovascular AMD with Active CNV. Prep: Betadine Drops and Betadine Scrub. Prior to injection, risks/benefits/alternatives discussed including corneal abrasion, infection, loss of vision, hemorrhage, cataract, glaucoma, retinal tears or detachment. A written consent is on file, and the need for today’s injection was discussed and the patient is understanding and wishes to proceed. The entire vial of Eylea was drawn up into a syringe. The opened vial, remaining drug, and filtered needle were disposed of in a certified biohazard container. Betadine prep was performed. Topical anesthesia was induced with Alcaine. 4% lidocaine pledge. A lid speculum was used. A short 30g needle on a 1cc syringe was used with product that that had previously been prepared under sterile conditions. Injection site: 3-4 mm from the limbus. The used syringe/needle was transferred to a biohazard container. Lid speculum removed. Mask worn during procedure. Patient tolerated procedure well. Count fingers vision was verified. There were no complications. Patient was given the standard instruction sheet. Patient given office phone number/answering service number and advised to call immediately should there be loss of vision or pain, or should they have any other questions or concerns. Lizabeth De La Vega B positive

## 2023-01-11 NOTE — OB NEONATOLOGY/PEDIATRICIAN DELIVERY SUMMARY - NSPEDSNEONOTESA_OBGYN_ALL_OB_FT
Peds called to OR at 3 MOL for light meconium staining. 40.1 wk AGA male born via repeat scheduled CS to a 31 y/o  mother. Maternal history of sickle cell trait, primary CS in  for NRFHT. Prenatal course significant for diagnoses of absent nasal bone and gestational thrombocytopenia. Maternal labs include Blood Type B+ , HIV - , RPR NR , Rubella I , Hep B - , GBS + on , COVID -. ROM at time of delivery with light meconium fluids.  Baby emerged vigorous, crying, was w/d/s/s with APGARS of 9/9 .  Mom plans to initiate breastfeeding, consents Hep B vaccine and consents circ.     :   TOB: 15[birth year]  Weight: 3010    Physical Exam:  Gen: no acute distress, +grimace  HEENT:  anterior fontanel open soft and flat, nondysmorphic facies, no cleft lip/palate, ears normal set, no ear pits or tags, nares clinically patent  Resp: Normal respiratory effort without grunting or retractions, good air entry b/l, clear to auscultation bilaterally  Cardio: Present S1/S2, regular rate and rhythm, no murmurs  Abd: soft, non tender, non distended, umbilical cord with 3 vessels  Neuro: +palmar and plantar grasp, +suck, +natividad, normal tone  Extremities: negative avilez and ortolani maneuvers, moving all extremities, no clavicular crepitus or stepoff  Skin: pink, warm, peeling skin especially to hands and feet  Genitals: Normal male anatomy, testicles palpable in scrotum b/l, Jesús 1, anus patent Peds called to OR at MOL 2 for light meconium staining. 40.1 wk AGA male born via repeat scheduled CS to a 31 y/o  mother. Maternal history of sickle cell trait, primary CS in  for NRFHT. Prenatal course significant for diagnoses of absent nasal bone and gestational thrombocytopenia. Maternal labs include Blood Type B+ , HIV - , RPR NR , Rubella I , Hep B - , GBS + on , COVID -. ROM at time of delivery with light meconium fluids.  Baby emerged vigorous, crying, was w/d/s/s with APGARS of 9/9 .  Mom plans to initiate breastfeeding, consents Hep B vaccine and consents circ.     :   TOB: 1522  Weight: 3010    Physical Exam:  Gen: no acute distress, +grimace  HEENT:  anterior fontanel open soft and flat, nondysmorphic facies, no cleft lip/palate, ears normal set, no ear pits or tags, nares clinically patent  Resp: Normal respiratory effort without grunting or retractions, good air entry b/l, clear to auscultation bilaterally  Cardio: Present S1/S2, regular rate and rhythm, no murmurs  Abd: soft, non tender, non distended, umbilical cord with 3 vessels  Neuro: +palmar and plantar grasp, +suck, +natividad, normal tone  Extremities: negative avilez and ortolani maneuvers, moving all extremities, no clavicular crepitus or stepoff  Skin: pink, warm, peeling skin especially to hands and feet  Genitals: Normal male anatomy, testicles palpable in scrotum b/l, Jesús 1, anus patent

## 2023-01-11 NOTE — OB PROVIDER H&P - NSRISKFACTORS_OBGYN_ALL_OB
Cardiothoracic Surgery Consultation Note  [facility]    Hiram Tapia MRN# [id number]   Age: 60 year old YOB: 1958     Date of Admission:  4/12/2019    Reason for consult: Persistent Pneumothorax       Requesting physician: Dr. 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 Taylor, Thoracic Fellow, will discuss with staff.    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 Patient states that he has been having a cough at home but has been which has been getting better.  He was previously discharged on 4/9/19 after talc pleurodesis and tail chest tube placement. Denies any nausea, vomiting, chest pain, shortness of breath, fevers or chills, or issues with his breathing.    History is obtained from the patient in electronic health record          Past Medical History:     Past Medical History:   Diagnosis Date     Pulmonary embolism (H)      Sarcoma (H)              Past Surgical History:     Past Surgical History:   Procedure Laterality Date     EXCISE SOFT TISSUE TUMOR THIGH Right 3/8/2018    Procedure: EXCISE SOFT TISSUE TUMOR THIGH;  Biopsy Right Thigh Tumor;  Surgeon: Brad Betts  MD Fabien;  Location: [facility] OR     EXCISE SOFT TISSUE TUMOR THIGH Right 2018    Procedure: EXCISE SOFT TISSUE TUMOR THIGH;  Removal Right Thigh Tumor ;  Surgeon: Brad Betts MD;  Location: [facility] OR     INSERT PORT VASCULAR ACCESS N/A 3/28/2018    Procedure: INSERT PORT VASCULAR ACCESS;  Vascular Access Port Insertion with C-arm;  Surgeon: Sarah Bowie MD;  Location: [facility] OR     IRRIGATION AND DEBRIDEMENT LOWER EXTREMITY, COMBINED Right 2018    Procedure: COMBINED IRRIGATION AND DEBRIDEMENT LOWER EXTREMITY;  Irrigation And Debridement Right Thigh ;  Surgeon: Brad Betts MD;  Location: [facility] OR     IRRIGATION AND DEBRIDEMENT LOWER EXTREMITY, COMBINED Right 2018    Procedure: COMBINED IRRIGATION AND DEBRIDEMENT LOWER EXTREMITY;  Irrigation And Debridement Right Thigh Wound. with Wound VAC Exchange;  Surgeon: Brad Betts MD;  Location: [facility] OR     STRABISMUS SURGERY      as a child     THORACOSCOPIC WEDGE RESECTION LUNG Right 2018    Procedure: Right Video Assisted Thoracoscopic Wedge Resection;  Surgeon: Sarah Bowie MD;  Location: [facility] OR             Social History:     Social History     Socioeconomic History     Marital status:      Spouse name: Not on file     Number of children: Not on file     Years of education: Not on file     Highest education level: Not on file   Occupational History     Not on file   Social Needs     Financial resource strain: Not on file     Food insecurity:     Worry: Not on file     Inability: Not on file     Transportation needs:     Medical: Not on file     Non-medical: Not on file   Tobacco Use     Smoking status: Former Smoker     Packs/day: 1.00     Years: 10.00     Pack years: 10.00     Types: Cigarettes     Start date: 1975     Last attempt to quit: 1990     Years since quittin.2     Smokeless tobacco: Never Used   Substance and Sexual Activity     Alcohol use: Yes     Alcohol/week: 8.4 oz     Comment: 1 beer a day       "Drug use: No     Sexual activity: Not Currently     Partners: Female   Lifestyle     Physical activity:     Days per week: Not on file     Minutes per session: Not on file     Stress: Not on file   Relationships     Social connections:     Talks on phone: Not on file     Gets together: Not on file     Attends [employer] service: Not on file     Active member of club or organization: Not on file     Attends meetings of clubs or organizations: Not on file     Relationship status: Not on file     Intimate partner violence:     Fear of current or ex partner: Not on file     Emotionally abused: Not on file     Physically abused: Not on file     Forced sexual activity: Not on file   Other Topics Concern     Parent/sibling w/ CABG, MI or angioplasty before 65F 55M? Not Asked   Social History Narrative     Not on file             Family History:     Family History   Problem Relation Age of Onset     Leukemia Father              Allergies:      Allergies   Allergen Reactions     Hydrofera Blue 4\"X4\" [Wound Dressings] Dermatitis and Blisters     Patient reports that Dressing causes skin irritation, blisters, and drainage.      Tegaderm Ag Mesh [Silver] Dermatitis and Blisters     Patient reports that Dressing causes Skin irritation, blisters, and drainage.             Medications:       No current facility-administered medications on file prior to encounter.   Current Outpatient Medications on File Prior to Encounter:  docusate sodium (COLACE) 100 MG capsule Take 100 mg by mouth 2 times daily as needed for constipation   oxyCODONE (OXY-IR) 5 MG capsule Take 1-5 capsules every 4 hours as needed for pain             Review of Systems:      All other review of systems negative, except for what is mentioned above        Physical Exam:   /72 (BP Location: Right arm)   Pulse 80   Temp 98.2  F (36.8  C) (Oral)   Resp 12   Ht 1.778 m (5' 10\")   SpO2 96%   BMI 26.96 kg/m    General: Alert, interactive, NAD with wife in the " room  Resp: CTAB, no crackles or wheezes  Cardiac: RRR, NS1,S2, No m/r/g  Abdomen: Soft, nontender, nondistended. No r/g  Extremities: No LE edema or obvious joint abnormalities  Skin: Warm and dry, no jaundice or rash  Neuro: A&Ox3, CN 2-12 intact, MUHAMMAD            Data:     Lab Results   Component Value Date    WBC 6.5 04/12/2019    HGB 13.8 04/12/2019    HCT 43.5 04/12/2019     04/12/2019     04/12/2019    POTASSIUM 4.0 04/12/2019    CHLORIDE 101 04/12/2019    CO2 26 04/12/2019    BUN 14 04/12/2019    CR 0.87 04/12/2019    GLC 95 04/12/2019    TROPI <0.015 06/26/2018    AST 16 04/12/2019    ALT 27 04/12/2019    ALKPHOS 123 04/12/2019    BILITOTAL 0.4 04/12/2019    INR 1.11 04/12/2019     All imaging studies reviewed     CXR: 04/12/19   1. Trace right pleural effusion. Resolution of left pleural effusion.  Mild bibasilar atelectasis. Platelike atelectasis in the middle right  lobe.  2. Small reduction in right pneumothorax.   3. Unchanged pulmonary nodules/masses.      Alexsander Thomas MD (PGY-1)  General Surgery Resident  Thoracic Surgery          No

## 2023-01-11 NOTE — OB PROVIDER H&P - NSLOWPPHRISK_OBGYN_A_OB
Rodriguez Pregnancy/Less than or equal to 4 previous vaginal births/No known bleeding disorder/No history of postpartum hemorrhage

## 2023-01-11 NOTE — OB PROVIDER H&P - NSHPSOCIALHISTORY_GEN_ALL_CORE
Lives at home with , feels safe. Denies alcohol/tobacco/drug use during pregnancy. Denies GC/Chlam/HIV/herpes/STIs.

## 2023-01-11 NOTE — OB PROVIDER H&P - HISTORY OF PRESENT ILLNESS
31yo female  FLORENCIA 1/10/23 presents @40w1d for scheduled rLTCS d/t pLTCS. Pt wanted to TOLAC and is admitting she is feeling contractions today, started @7am, Q8 minutes now, 7/10 pain. Denies SOB, fever, chills or cough. Negative COVID-19 test (23).   Denies VB & ROM today. +FM    Antepartum Course: anatomy scan showed absent nasal bone, passed GCT, GBS positive as per pt, NIPT low risk  Med Hx: GERD, Sickle cell trait, Denies asthma, HTN, DM, CAD, or other medical issues  Meds: PNV, Fe, denies other medications including prescribed/OTC   Allergies: NKDA, NKEA, NKFA  Surgical Hx: c/sx1, L salpingectomy s/p ruptured ectopic   33yo female  FLORENCIA 1/10/23 presents @40w1d for scheduled rLTCS d/t pLTCS. Pt wanted to TOLAC and is admitting she is feeling contractions today, started @7am, Q8 minutes now, 7/10 pain. Denies SOB, fever, chills or cough. Negative COVID-19 test (23).   Denies VB & ROM today. +FM    Antepartum Course: anatomy scan showed absent nasal bone, passed GCT, GBS positive as per pt, NIPT low risk, gestational thrombocytopenia  Med Hx: GERD, Sickle cell trait, Gestational thrombocytopenia (seen in her last pregnancy see HIE), Denies asthma, HTN, DM, CAD, or other medical issues  Meds: PNV, Fe, denies other medications including prescribed/OTC   Allergies: NKDA, NKEA, NKFA  Surgical Hx: c/sx1, L salpingectomy s/p ruptured ectopic

## 2023-01-11 NOTE — OB RN INTRAOPERATIVE NOTE - NSSELHIDDEN_OBGYN_ALL_OB_FT
[NS_DeliveryAttending1_OBGYN_ALL_OB_FT:[id number]],[NS_DeliveryRN_OBGYN_ALL_OB_FT:[id number]] [NS_DeliveryAttending1_OBGYN_ALL_OB_FT:[id number]],[NS_DeliveryRN_OBGYN_ALL_OB_FT:[id number]],[NS_DeliveryAssist2_OBGYN_ALL_OB_FT:[id number]]

## 2023-01-11 NOTE — OB RN PATIENT PROFILE - FALL HARM RISK - UNIVERSAL INTERVENTIONS
Bed in lowest position, wheels locked, appropriate side rails in place/Call bell, personal items and telephone in reach/Instruct patient to call for assistance before getting out of bed or chair/Non-slip footwear when patient is out of bed/Iva to call system/Physically safe environment - no spills, clutter or unnecessary equipment/Purposeful Proactive Rounding/Room/bathroom lighting operational, light cord in reach

## 2023-01-11 NOTE — OB PROVIDER H&P - ASSESSMENT
Dx: Scheduled rLTCS  Dx: FA- absent nasal bone    - Admit to L&D  - NPO  - EFM/TOCO  - IV access, CBC/T&C/RPR  - Pepcid and Bicitra as per pre-op policy  - Anesthesia consult   - Blood transfusion consent  - Operative Consent to be discussed and obtained by Dr. St  - VE by Dr. St 1/0/-3, Pt not candidate for TOLAC  - Plan to move to OR on time schedule  - Discussed with Dr. Maciel Joy, PAC   Dx: Scheduled rLTCS  Dx: FA- absent nasal bone  Dx: Gestational thrombocytopenia- Plt today 131    - Admit to L&D  - NPO  - EFM/TOCO  - IV access, CBC/T&C/RPR  - Coags  - Pepcid and Bicitra as per pre-op policy  - Anesthesia consult   - Blood transfusion consent  - Operative Consent to be discussed and obtained by Dr. St  - VE by Dr. St 1/0/-3, Pt not candidate for TOLAC  - Plan to move to OR on time schedule  - Discussed with Dr. Maciel Joy, PAC

## 2023-01-11 NOTE — OB PROVIDER H&P - NSHPPHYSICALEXAM_GEN_ALL_CORE
Vitals: ICU Vital Signs Last 24 Hrs  T(C): 37 (11 Jan 2023 13:13), Max: 37.0 (11 Jan 2023 13:13)  T(F): 98.6 (11 Jan 2023 13:13), Max: 98.6 (11 Jan 2023 13:13)  HR: 67 (11 Jan 2023 13:13) (67 - 67)  BP: 113/67 (11 Jan 2023 13:13) (113/67 - 113/67)  BP(mean): --  ABP: --  ABP(mean): --  RR: 16 (11 Jan 2023 13:13) (16 - 16)  SpO2: --      General: A&O x3  Heart: RRR, S1 & S2  Lungs: CTA b/l, good inspiratory/expiratory effort. No ronchi, no rales  Abdomen: Soft, Gravid, TOCO in place, +pfannenstial scar    EFM: baseline , moderate variability, +accels, -decels, Category 1  TOCO: contractions noted, irregular, q6-10  min  Bedside Transabdominal US: cephalic position, posterior placenta, +FHM    Extremities: FROM, bilateral 1+ LE edema  Neuro: grossly intact

## 2023-01-11 NOTE — OB PROVIDER DELIVERY SUMMARY - NSSELHIDDEN_OBGYN_ALL_OB_FT
[NS_DeliveryAttending1_OBGYN_ALL_OB_FT:[id number]],[NS_DeliveryRN_OBGYN_ALL_OB_FT:[id number]] [NS_DeliveryAttending1_OBGYN_ALL_OB_FT:[id number]],[NS_DeliveryRN_OBGYN_ALL_OB_FT:[id number]],[NS_DeliveryAssist1_OBGYN_ALL_OB_FT:[id number]]

## 2023-01-11 NOTE — OB PROVIDER DELIVERY SUMMARY - NSPROVIDERDELIVERYNOTE_OBGYN_ALL_OB_FT
scheduled repeat lower uterine segment   baby delivered in cephalic presentation  no nucha cord noted  shoulder and body followed with ease  mouth suctioned, cord clamped x 2 and cut  baby handed to awaiting nurse and pediatrician  placenta delivered manually, uterus cleared of clots and debri  uterine incision closed in one layer, gutters cleared of clots and drebri  peritoneum, fascia, subcuticular area and skin closed    pressure dressing applied    baby boy apgars 9/9  weight 3010g  ebl 450cc scheduled repeat lower uterine segment   baby delivered in cephalic presentation  no nuchal cord noted  shoulder and body followed with ease  terminal meconium noted  mouth suctioned, cord clamped x 2 and cut  baby handed to awaiting nurse and pediatrician  placenta delivered manually, uterus cleared of clots and debri  uterine incision closed in one layer, gutters cleared of clots and debri  keloid removed from incision site  peritoneum, fascia, subcuticular area and skin closed    pressure dressing applied    baby boy apgars 9/9  weight 3010g  ebl 450cc    Dictation #[id number]

## 2023-01-12 LAB
ANISOCYTOSIS BLD QL: SLIGHT — SIGNIFICANT CHANGE UP
BASOPHILS # BLD AUTO: 0 K/UL — SIGNIFICANT CHANGE UP (ref 0–0.2)
BASOPHILS NFR BLD AUTO: 0 % — SIGNIFICANT CHANGE UP (ref 0–2)
EOSINOPHIL # BLD AUTO: 0 K/UL — SIGNIFICANT CHANGE UP (ref 0–0.5)
EOSINOPHIL NFR BLD AUTO: 0 % — SIGNIFICANT CHANGE UP (ref 0–6)
GIANT PLATELETS BLD QL SMEAR: PRESENT — SIGNIFICANT CHANGE UP
HCT VFR BLD CALC: 31.4 % — LOW (ref 34.5–45)
HGB BLD-MCNC: 10.7 G/DL — LOW (ref 11.5–15.5)
IANC: 13.95 K/UL — HIGH (ref 1.8–7.4)
LYMPHOCYTES # BLD AUTO: 1.19 K/UL — SIGNIFICANT CHANGE UP (ref 1–3.3)
LYMPHOCYTES # BLD AUTO: 7.2 % — LOW (ref 13–44)
MACROCYTES BLD QL: SLIGHT — SIGNIFICANT CHANGE UP
MANUAL SMEAR VERIFICATION: SIGNIFICANT CHANGE UP
MCHC RBC-ENTMCNC: 30.7 PG — SIGNIFICANT CHANGE UP (ref 27–34)
MCHC RBC-ENTMCNC: 34.1 GM/DL — SIGNIFICANT CHANGE UP (ref 32–36)
MCV RBC AUTO: 90.2 FL — SIGNIFICANT CHANGE UP (ref 80–100)
MONOCYTES # BLD AUTO: 0.44 K/UL — SIGNIFICANT CHANGE UP (ref 0–0.9)
MONOCYTES NFR BLD AUTO: 2.7 % — SIGNIFICANT CHANGE UP (ref 2–14)
NEUTROPHILS # BLD AUTO: 13.94 K/UL — HIGH (ref 1.8–7.4)
NEUTROPHILS NFR BLD AUTO: 82.9 % — HIGH (ref 43–77)
NEUTS BAND # BLD: 1.8 % — SIGNIFICANT CHANGE UP (ref 0–6)
OVALOCYTES BLD QL SMEAR: SLIGHT — SIGNIFICANT CHANGE UP
PLAT MORPH BLD: NORMAL — SIGNIFICANT CHANGE UP
PLATELET # BLD AUTO: 110 K/UL — LOW (ref 150–400)
PLATELET COUNT - ESTIMATE: ABNORMAL
POIKILOCYTOSIS BLD QL AUTO: SLIGHT — SIGNIFICANT CHANGE UP
POLYCHROMASIA BLD QL SMEAR: SLIGHT — SIGNIFICANT CHANGE UP
RBC # BLD: 3.48 M/UL — LOW (ref 3.8–5.2)
RBC # FLD: 13.8 % — SIGNIFICANT CHANGE UP (ref 10.3–14.5)
RBC BLD AUTO: ABNORMAL
SMUDGE CELLS # BLD: PRESENT — SIGNIFICANT CHANGE UP
VARIANT LYMPHS # BLD: 5.4 % — SIGNIFICANT CHANGE UP (ref 0–6)
WBC # BLD: 16.46 K/UL — HIGH (ref 3.8–10.5)
WBC # FLD AUTO: 16.46 K/UL — HIGH (ref 3.8–10.5)

## 2023-01-12 RX ORDER — SODIUM CHLORIDE 0.65 %
1 AEROSOL, SPRAY (ML) NASAL THREE TIMES A DAY
Refills: 0 | Status: DISCONTINUED | OUTPATIENT
Start: 2023-01-12 | End: 2023-01-13

## 2023-01-12 RX ORDER — IBUPROFEN 200 MG
600 TABLET ORAL EVERY 6 HOURS
Refills: 0 | Status: DISCONTINUED | OUTPATIENT
Start: 2023-01-12 | End: 2023-01-13

## 2023-01-12 RX ADMIN — Medication 600 MILLIGRAM(S): at 17:14

## 2023-01-12 RX ADMIN — Medication 600 MILLIGRAM(S): at 23:30

## 2023-01-12 RX ADMIN — Medication 975 MILLIGRAM(S): at 10:29

## 2023-01-12 RX ADMIN — Medication 1 SPRAY(S): at 15:36

## 2023-01-12 RX ADMIN — Medication 30 MILLIGRAM(S): at 05:29

## 2023-01-12 RX ADMIN — HEPARIN SODIUM 5000 UNIT(S): 5000 INJECTION INTRAVENOUS; SUBCUTANEOUS at 23:30

## 2023-01-12 RX ADMIN — Medication 1 APPLICATION(S): at 05:57

## 2023-01-12 RX ADMIN — Medication 30 MILLIGRAM(S): at 01:00

## 2023-01-12 RX ADMIN — Medication 975 MILLIGRAM(S): at 15:33

## 2023-01-12 RX ADMIN — Medication 30 MILLIGRAM(S): at 11:41

## 2023-01-12 RX ADMIN — Medication 975 MILLIGRAM(S): at 21:05

## 2023-01-12 RX ADMIN — Medication 975 MILLIGRAM(S): at 20:20

## 2023-01-12 RX ADMIN — Medication 30 MILLIGRAM(S): at 00:17

## 2023-01-12 RX ADMIN — HEPARIN SODIUM 5000 UNIT(S): 5000 INJECTION INTRAVENOUS; SUBCUTANEOUS at 00:25

## 2023-01-12 RX ADMIN — Medication 975 MILLIGRAM(S): at 09:29

## 2023-01-12 RX ADMIN — Medication 30 MILLIGRAM(S): at 11:56

## 2023-01-12 RX ADMIN — Medication 1 SPRAY(S): at 23:30

## 2023-01-12 RX ADMIN — HEPARIN SODIUM 5000 UNIT(S): 5000 INJECTION INTRAVENOUS; SUBCUTANEOUS at 11:44

## 2023-01-12 RX ADMIN — Medication 975 MILLIGRAM(S): at 14:33

## 2023-01-12 RX ADMIN — Medication 30 MILLIGRAM(S): at 06:09

## 2023-01-12 NOTE — PROGRESS NOTE ADULT - ASSESSMENT
A/P: 31yo POD#1 s/p rLTCS cb gestational thrombocytopenia EBL 450cc.  Patient is stable and doing well post-operatively.    - Plt 140->131  - Continue regular diet.  - Increase ambulation.  - Continue motrin, tylenol, oxycodone PRN for pain control.    - F/u AM CBC    Bong Capone PGY1

## 2023-01-12 NOTE — PROGRESS NOTE ADULT - SUBJECTIVE AND OBJECTIVE BOX
ANESTHESIA POSTOP CHECK    32y Female POSTOP DAY 1 S/P     Vital Signs Last 24 Hrs  T(C): 37 (12 Jan 2023 06:08), Max: 37.0 (11 Jan 2023 13:13)  T(F): 98.6 (12 Jan 2023 06:08), Max: 98.6 (11 Jan 2023 13:13)  HR: 81 (12 Jan 2023 06:08) (64 - 98)  BP: 102/50 (12 Jan 2023 06:08) (86/66 - 113/71)  BP(mean): 48 (11 Jan 2023 20:00) (48 - 78)  RR: 18 (12 Jan 2023 06:08) (10 - 21)  SpO2: 98% (12 Jan 2023 06:08) (96% - 100%)    Parameters below as of 12 Jan 2023 06:08  Patient On (Oxygen Delivery Method): room air      I&O's Summary    11 Jan 2023 07:01  -  12 Jan 2023 07:00  --------------------------------------------------------  IN: 5075 mL / OUT: 2225 mL / NET: 2850 mL        [ x] NO APPARENT ANESTHESIA COMPLICATIONS

## 2023-01-12 NOTE — PROGRESS NOTE ADULT - SUBJECTIVE AND OBJECTIVE BOX
Postop Day  __1_ s/p   C- Section    THERAPY:  [ x ] Spinal morphine   [  ] Epidural morphine   [  ] IV PCA Hydromorphone 1 mg/ml      Sedation Score:	  [ x ] Alert	    [  ] Drowsy        [  ] Arousable	[  ] Asleep	[  ] Unresponsive    Side Effects:	  [ x ] None	     [  ] Nausea        [  ] Pruritus        [  ] Weakness   [  ] Numbness        ASSESSMENT/ PLAN   [x] Gross Neurological Exam within Normal Limits  [   ] Discontinue         [  ] Continue  [X] Change to PO Pain medications as per OB team  [ x ]Documentation and Verification of current medications     Comments:

## 2023-01-12 NOTE — PROGRESS NOTE ADULT - SUBJECTIVE AND OBJECTIVE BOX
OB Progress Note:  Delivery, POD#1    S: 31yo POD#1 s/p LTCS . Her pain is well controlled. She is tolerating a regular diet and passing flatus. Denies N/V. Denies CP/SOB/lightheadedness/dizziness.   She is ambulating without difficulty.   Voiding spontaneously.     O:   Vital Signs Last 24 Hrs  T(C): 36.9 (:), Max: 37.0 (2023 13:13)  T(F): 98.4 (:), Max: 98.6 (2023 13:13)  HR: 98 (:) (64 - 98)  BP: 102/56 (:) (86/66 - 113/71)  BP(mean): 48 (2023 20:00) (48 - 78)  RR: 18 (:) (10 - 21)  SpO2: 98% (:) (96% - 100%)    Parameters below as of :17  Patient On (Oxygen Delivery Method): room air        Labs:  Blood type: B Positive  Rubella IgG: RPR: Negative                          12.9   8.39 >-----------< 131<L>    (  @ 13:50 )             38.2                  PE:  General: NAD  Abdomen: Mildly distended, appropriately tender, incision c/d/i.  Extremities: No erythema, no pitting edema     OB Progress Note:  Delivery, POD#1    S: 31yo POD#1 s/p LTCS . Her pain is well controlled. She is tolerating a regular diet and has not passed flatus. Denies N/V. Denies CP/SOB/lightheadedness/dizziness.   She is ambulating without difficulty.   Voiding spontaneously.     O:   Vital Signs Last 24 Hrs  T(C): 36.9 (:), Max: 37.0 (2023 13:13)  T(F): 98.4 (:), Max: 98.6 (2023 13:13)  HR: 98 (:) (64 - 98)  BP: 102/56 (:) (86/66 - 113/71)  BP(mean): 48 (2023 20:00) (48 - 78)  RR: 18 (:) (10 - 21)  SpO2: 98% (:) (96% - 100%)    Parameters below as of :  Patient On (Oxygen Delivery Method): room air        Labs:  Blood type: B Positive  Rubella IgG: RPR: Negative                          12.9   8.39 >-----------< 131<L>    (  @ 13:50 )             38.2                  PE:  General: NAD  Abdomen: Mildly distended, appropriately tender, incision c/d/i.  Extremities: No erythema, no pitting edema

## 2023-01-12 NOTE — PROGRESS NOTE ADULT - SUBJECTIVE AND OBJECTIVE BOX
Attending note  Patient was seen and evauated at bedside.  S: 32y POD#1  Patient doing well. Minimal to moderate lochia. Pain controlled. Voiding. Passing Flatus. Tolerating a regular diet.   denies chest pain, palpitations or lightheadedness  O: Vital Signs Last 24 Hrs  T(C): 36.4 (12 Jan 2023 10:00), Max: 37 (11 Jan 2023 19:00)  T(F): 97.6 (12 Jan 2023 10:00), Max: 98.6 (11 Jan 2023 19:00)  HR: 81 (12 Jan 2023 06:08) (64 - 98)  BP: 106/60 (12 Jan 2023 10:00) (86/66 - 113/71)  BP(mean): 48 (11 Jan 2023 20:00) (48 - 78)  RR: 18 (12 Jan 2023 10:00) (10 - 21)  SpO2: 99% (12 Jan 2023 10:00) (96% - 100%)    Parameters below as of 12 Jan 2023 10:00  Patient On (Oxygen Delivery Method): room air        Gen: NAD A+Ox3  Abd: soft, NT, ND, fundus firm below umbilicus  Incision: clean, dry, intact  Lochia: minimal to moderate  Ext: no tenderness b/l, no swelling    Meds:  acetaminophen     Tablet .. 975 milliGRAM(s) Oral <User Schedule>  diphenhydrAMINE 25 milliGRAM(s) Oral every 6 hours PRN  diphtheria/tetanus/pertussis (acellular) Vaccine (Adacel) 0.5 milliLiter(s) IntraMuscular once  heparin   Injectable 5000 Unit(s) SubCutaneous every 12 hours  ibuprofen  Tablet. 600 milliGRAM(s) Oral every 6 hours  ketorolac   Injectable 30 milliGRAM(s) IV Push every 6 hours  lanolin Ointment 1 Application(s) Topical every 6 hours PRN  magnesium hydroxide Suspension 30 milliLiter(s) Oral two times a day PRN  oxyCODONE    IR 5 milliGRAM(s) Oral every 3 hours PRN  oxyCODONE    IR 5 milliGRAM(s) Oral once PRN  simethicone 80 milliGRAM(s) Chew every 4 hours PRN    Labs:                        10.7   16.46 )-----------( 110      ( 12 Jan 2023 04:55 )             31.4       A: 32y POD# s/p CD. Doing well.    Plan: Increase ambulation. Advance diet as tolerated. Tylenol and Motrin q6 hrs. Oxycodone prn.   DVT prophylaxis. Routine postop care.

## 2023-01-13 ENCOUNTER — TRANSCRIPTION ENCOUNTER (OUTPATIENT)
Age: 33
End: 2023-01-13

## 2023-01-13 VITALS
DIASTOLIC BLOOD PRESSURE: 70 MMHG | HEART RATE: 84 BPM | RESPIRATION RATE: 17 BRPM | TEMPERATURE: 98 F | OXYGEN SATURATION: 100 % | SYSTOLIC BLOOD PRESSURE: 115 MMHG

## 2023-01-13 LAB
BASOPHILS # BLD AUTO: 0.03 K/UL — SIGNIFICANT CHANGE UP (ref 0–0.2)
BASOPHILS NFR BLD AUTO: 0.3 % — SIGNIFICANT CHANGE UP (ref 0–2)
EOSINOPHIL # BLD AUTO: 0.09 K/UL — SIGNIFICANT CHANGE UP (ref 0–0.5)
EOSINOPHIL NFR BLD AUTO: 0.9 % — SIGNIFICANT CHANGE UP (ref 0–6)
HCT VFR BLD CALC: 30.5 % — LOW (ref 34.5–45)
HGB BLD-MCNC: 10.1 G/DL — LOW (ref 11.5–15.5)
IANC: 7.18 K/UL — SIGNIFICANT CHANGE UP (ref 1.8–7.4)
IMM GRANULOCYTES NFR BLD AUTO: 0.3 % — SIGNIFICANT CHANGE UP (ref 0–0.9)
LYMPHOCYTES # BLD AUTO: 1.97 K/UL — SIGNIFICANT CHANGE UP (ref 1–3.3)
LYMPHOCYTES # BLD AUTO: 19.6 % — SIGNIFICANT CHANGE UP (ref 13–44)
MCHC RBC-ENTMCNC: 30.1 PG — SIGNIFICANT CHANGE UP (ref 27–34)
MCHC RBC-ENTMCNC: 33.1 GM/DL — SIGNIFICANT CHANGE UP (ref 32–36)
MCV RBC AUTO: 91 FL — SIGNIFICANT CHANGE UP (ref 80–100)
MONOCYTES # BLD AUTO: 0.77 K/UL — SIGNIFICANT CHANGE UP (ref 0–0.9)
MONOCYTES NFR BLD AUTO: 7.6 % — SIGNIFICANT CHANGE UP (ref 2–14)
NEUTROPHILS # BLD AUTO: 7.18 K/UL — SIGNIFICANT CHANGE UP (ref 1.8–7.4)
NEUTROPHILS NFR BLD AUTO: 71.3 % — SIGNIFICANT CHANGE UP (ref 43–77)
NRBC # BLD: 0 /100 WBCS — SIGNIFICANT CHANGE UP (ref 0–0)
NRBC # FLD: 0 K/UL — SIGNIFICANT CHANGE UP (ref 0–0)
PLATELET # BLD AUTO: 133 K/UL — LOW (ref 150–400)
RBC # BLD: 3.35 M/UL — LOW (ref 3.8–5.2)
RBC # FLD: 13.7 % — SIGNIFICANT CHANGE UP (ref 10.3–14.5)
WBC # BLD: 10.33 K/UL — SIGNIFICANT CHANGE UP (ref 3.8–10.5)
WBC # FLD AUTO: 10.33 K/UL — SIGNIFICANT CHANGE UP (ref 3.8–10.5)

## 2023-01-13 RX ORDER — FERROUS SULFATE 325(65) MG
325 TABLET ORAL
Refills: 0 | Status: DISCONTINUED | OUTPATIENT
Start: 2023-01-13 | End: 2023-01-13

## 2023-01-13 RX ORDER — IBUPROFEN 200 MG
1 TABLET ORAL
Qty: 0 | Refills: 0 | DISCHARGE
Start: 2023-01-13

## 2023-01-13 RX ORDER — SENNA PLUS 8.6 MG/1
1 TABLET ORAL
Refills: 0 | Status: DISCONTINUED | OUTPATIENT
Start: 2023-01-13 | End: 2023-01-13

## 2023-01-13 RX ORDER — ASCORBIC ACID 60 MG
500 TABLET,CHEWABLE ORAL DAILY
Refills: 0 | Status: DISCONTINUED | OUTPATIENT
Start: 2023-01-13 | End: 2023-01-13

## 2023-01-13 RX ADMIN — Medication 500 MILLIGRAM(S): at 12:01

## 2023-01-13 RX ADMIN — Medication 600 MILLIGRAM(S): at 00:15

## 2023-01-13 RX ADMIN — Medication 325 MILLIGRAM(S): at 16:08

## 2023-01-13 RX ADMIN — Medication 975 MILLIGRAM(S): at 02:26

## 2023-01-13 RX ADMIN — Medication 975 MILLIGRAM(S): at 03:00

## 2023-01-13 RX ADMIN — Medication 600 MILLIGRAM(S): at 06:25

## 2023-01-13 RX ADMIN — Medication 975 MILLIGRAM(S): at 16:08

## 2023-01-13 RX ADMIN — Medication 600 MILLIGRAM(S): at 13:00

## 2023-01-13 RX ADMIN — Medication 975 MILLIGRAM(S): at 09:45

## 2023-01-13 RX ADMIN — Medication 600 MILLIGRAM(S): at 12:02

## 2023-01-13 RX ADMIN — Medication 975 MILLIGRAM(S): at 09:07

## 2023-01-13 RX ADMIN — HEPARIN SODIUM 5000 UNIT(S): 5000 INJECTION INTRAVENOUS; SUBCUTANEOUS at 12:02

## 2023-01-13 RX ADMIN — Medication 600 MILLIGRAM(S): at 05:26

## 2023-01-13 NOTE — DISCHARGE NOTE OB - NS MD DC FALL RISK RISK
For information on Fall & Injury Prevention, visit: [email or web address] OR  [email or web address] OR  https://www.cdc.gov/steadi/patient.html

## 2023-01-13 NOTE — PROGRESS NOTE ADULT - SUBJECTIVE AND OBJECTIVE BOX
SUBJECTIVE:    Pain: Controlled    Complaints: None    MILESTONES:    Alert and Oriented x 3  [ x ]  Out of bed/ ambulating. [ x ]  Flatus:   Positive [ x ]  Negative [  ]  Bowel movement  [  ] Positive [  ] Negative   Voiding [x  ] Due to void [  ]   Marcos/Indwelling catheter in place [  ]  Diet: Regular [ x ]  Clears [  ]  NPO [  ]    Infant feeding:  Breast [  ]   Bottle [  ]  Both [ X ]  Feeding related issues and/or concerns:      OBJECTIVE:  T(C): 36.7 (23 @ 05:35), Max: 36.9 (23 @ 13:54)  HR: 83 (23 @ 05:35) (83 - 88)  BP: 106/66 (23 @ 05:35) (106/66 - 113/62)  RR: 18 (23 @ 05:35) (18 - 18)  SpO2: 99% (23 @ 05:35) (99% - 99%)  Wt(kg): --                        10.1   10.33 )-----------( 133      ( 2023 05:40 )             30.5           Blood Type: B Positive    RPR: Negative          MEDICATIONS  (STANDING):  acetaminophen     Tablet .. 975 milliGRAM(s) Oral <User Schedule>  ascorbic acid 500 milliGRAM(s) Oral daily  diphtheria/tetanus/pertussis (acellular) Vaccine (Adacel) 0.5 milliLiter(s) IntraMuscular once  ferrous    sulfate 325 milliGRAM(s) Oral two times a day  heparin   Injectable 5000 Unit(s) SubCutaneous every 12 hours  ibuprofen  Tablet. 600 milliGRAM(s) Oral every 6 hours  senna 1 Tablet(s) Oral two times a day    MEDICATIONS  (PRN):  diphenhydrAMINE 25 milliGRAM(s) Oral every 6 hours PRN Pruritus  lanolin Ointment 1 Application(s) Topical every 6 hours PRN Sore Nipples  magnesium hydroxide Suspension 30 milliLiter(s) Oral two times a day PRN Constipation  oxyCODONE    IR 5 milliGRAM(s) Oral every 3 hours PRN Moderate to Severe Pain (4-10)  oxyCODONE    IR 5 milliGRAM(s) Oral once PRN Moderate to Severe Pain (4-10)  simethicone 80 milliGRAM(s) Chew every 4 hours PRN Gas  sodium chloride 0.65% Nasal 1 Spray(s) Both Nostrils three times a day PRN Nasal Congestion        ASSESSMENT:    32y     G  4    P       PO Day#  2        Delivery: Primary [  ]    Repeat [ X ]        QBL - 450                                  Indication of procedure: Scheduled    Condition: Stable    Past Medical History significant for: HPI:  31yo female  FLORENCIA 1/10/23 presents @40w1d for scheduled rLTCS d/t pLTCS. Pt wanted to TOLAC and is admitting she is feeling contractions today, started @7am, Q8 minutes now, 7/10 pain. Denies SOB, fever, chills or cough. Negative COVID-19 test (23).   Denies VB & ROM today. +FM    Antepartum Course: anatomy scan showed absent nasal bone, passed GCT, GBS positive as per pt, NIPT low risk, gestational thrombocytopenia  Med Hx: GERD, Sickle cell trait, Gestational thrombocytopenia (seen in her last pregnancy see HIE), Denies asthma, HTN, DM, CAD, or other medical issues  Meds: PNV, Fe, denies other medications including prescribed/OTC   Allergies: NKDA, NKEA, NKFA  Surgical Hx: c/sx1, L salpingectomy s/p ruptured ectopic   (2023 13:40)      Current Issues:    Breasts:  Soft [x  ]   Engorged [  ]  Nipples:  Abdomen: Soft [ x ]   Distended [  ] Nontender [  ]     Bowel sounds :  Present [  ]  Absent [  ]   Fundus:  Firm [x  ]  Boggy [  ]    Abdominal incision: Clean, Dry and Intact [x  ]  Staples [  ] Steri Strips [ X ] Dermabond [  ] Sutures [  ]  Dried blood noted on the steri strips    Patient wearing abdominal binder for support.    Vaginal: Lochia:  Heavy [  ]  Moderate [ x ]   Scant [  ]    Extremities: Edema [  ] Negative Ankur's Sign [ X ] Nontender Endy  [ x ] Positive pedal pulses [  ]    Other relevant physical exam findings:      PLAN:    Plan: Increase ambulation, analgesia PRN and pain medication protocol standing oxycodone, ibuprofen and acetaminophen.    Diet: Regular diet    Continue routine post-operative and postpartum care.  Hx. Gestational Thrombocytopenia. PLT - 133 ()    Discharge Planning [ x ]    For Discharge Today  [  X  ]    Consults:  Social Work [ X ]  Lactation [ x ]  Other [         ]

## 2023-01-13 NOTE — DISCHARGE NOTE OB - MATERIALS PROVIDED
Vaccinations/[facility]  Screening Program/  Immunization Record/Breastfeeding Log/Breastfeeding Mother’s Support Group Information/Guide to Postpartum Care/[facility] Hearing Screen Program/Back To Sleep Handout/Shaken Baby Prevention Handout/Breastfeeding Guide and Packet/Birth Certificate Instructions/Tdap Vaccination (VIS Pub Date: 2012)

## 2023-01-13 NOTE — DISCHARGE NOTE OB - HOSPITAL COURSE
31 y/o now P2 s/p schedule repeat CD at term  uncomplicated procedure and post op/post partum course  d/c home stable on POD#2

## 2023-01-13 NOTE — DISCHARGE NOTE OB - CARE PLAN
Principal Discharge DX:	Status post repeat low transverse  section  Assessment and plan of treatment:	33 y/o now P2 s/p schedule repeat CD at term  uncomplicated procedure and post op/post partum course  d/c home stable on POD#2   1

## 2023-01-13 NOTE — DISCHARGE NOTE OB - MEDICATION SUMMARY - MEDICATIONS TO TAKE
I will START or STAY ON the medications listed below when I get home from the hospital:    iron  -- 1 tab(s) by mouth once a day  -- Indication: For post partum     mg oral tablet  -- 1 tab(s) by mouth every 6 hours  -- Indication: For post     Prenatal Multivitamins oral tablet (obsolete)  -- 1 tab(s) by mouth once a day  -- Indication: For post partum

## 2023-01-13 NOTE — DISCHARGE NOTE OB - PLAN OF CARE
33 y/o now P2 s/p schedule repeat CD at term  uncomplicated procedure and post op/post partum course  d/c home stable on POD#2

## 2023-01-13 NOTE — DISCHARGE NOTE OB - PATIENT PORTAL LINK FT
You can access the FollowMyHealth Patient Portal offered by [facility] by registering at the following website: [email or web address]. By joining [email or web address]s FollowMyHealth portal, you will also be able to view your health information using other applications (apps) compatible with our system.

## 2023-01-13 NOTE — PROGRESS NOTE ADULT - SUBJECTIVE AND OBJECTIVE BOX
33 y/o now P2 s/p scheduled repeat CD at term POD#2  pt ambulating, voiding freely  tolerating reg diet  pos flatus  incisional pain controlled  reports min-mod lochia    vitals  /67 P 88 RR17 T 98.6 O2sat 97%RA  cardio ns1s2  lungs ctab  abdomen appropriate distention  pfann incision clean/dry/intact, subcut closure/steri strips  uterus firm/non tender at umbilical level  lext +1 edema/non tender bilat    POD#2 CBC  wbc 10, h/h 10.2/30 (from 12/38) [id number]    A/P  33 y/o P2 s/p repeat CD POD#2 stable  d/c home  PEC precautions  office f/up in 2 wks for incision check

## 2023-01-13 NOTE — DISCHARGE NOTE OB - CARE PROVIDER_API CALL
Brianna St)  Obstetrics and Gynecology  [facility] [place] [address]  Phone: [phone]  Fax: [phone]  Follow Up Time:

## 2024-03-11 NOTE — OB PROVIDER IHI INDUCTION/AUGMENTATION NOTE - LABOR: FETAL STATION
-3 Returned. Placed back onto monitor. Declines need of anything at this time. Aware we are waiting on results. Call light within reach. No acute distress noted at this time. Family at bedside.

## 2024-07-01 NOTE — OB PROVIDER H&P - NS_FHRACCEL_OBGYN_ALL_OB
Letter faxed to [facility] to push images into PACS [phone].   Letter faxed to Chad at [phone].   
Present (15 x15 bpm)

## 2025-02-03 NOTE — OB RN INTRAOPERATIVE NOTE - NS_DELIVERYASSIST2_OBGYN_ALL_OB_FT
"  Caller: Zoila Whipple \"Zoila Dixon\"    Relationship: Self    Best call back number: [phone]     What was the call regarding: PATIENT STATES SHE WAS RETURNING A CALL TO [facility] BUT I WAS UNABLE TO WARM TRANSFER. PATIENT STATES SHE WOULD LIKE FOR [facility] TO CALL HER BACK.  "
Spoke with pt  
Leona Cifuentes

## 2025-05-15 NOTE — OB RN PATIENT PROFILE - NS PRO RUBELLA RECEIVED Y/N
Please continue albuterol every 4 hours x 1 day, then every 6 hours for 1 day, and then every 8 hours until you are cleared by your pediatrician.     yes...

## (undated) DEVICE — BASIN SET SINGLE

## (undated) DEVICE — WARMING BLANKET UPPER ADULT

## (undated) DEVICE — ELCTR GROUNDING PAD ADULT COVIDIEN

## (undated) DEVICE — DRSG TELFA 3 X 8

## (undated) DEVICE — SUT MONOCRYL 4-0 27" PS-2 UNDYED

## (undated) DEVICE — ENDOCATCH 10MM SPECIMEN POUCH

## (undated) DEVICE — Device

## (undated) DEVICE — DRSG STERISTRIPS 0.5 X 4"

## (undated) DEVICE — GOWN XL

## (undated) DEVICE — VENODYNE/SCD SLEEVE CALF MEDIUM

## (undated) DEVICE — DRSG MASTISOL

## (undated) DEVICE — TRAP SPECIMEN SPUTUM 40CC

## (undated) DEVICE — TUBING OLYMPUS INSUFFLATION

## (undated) DEVICE — GLV 6.5 PROTEXIS (WHITE)

## (undated) DEVICE — FOLEY TRAY 16FR 5CC LF UMETER CLOSED

## (undated) DEVICE — TUBING HYDRO-SURG PLUS IRRIGATOR W SMOKEVAC & PROBE

## (undated) DEVICE — SOL IRR POUR H2O 500ML

## (undated) DEVICE — SYR LUER LOK 10CC

## (undated) DEVICE — SOL IRR BAG NS 0.9% 3000ML

## (undated) DEVICE — LIGASURE MARYLAND 37CM

## (undated) DEVICE — CANISTER DISPOSABLE THIN WALL 3000CC

## (undated) DEVICE — PROTECTOR HEEL / ELBOW FLUFFY

## (undated) DEVICE — TIP METZENBAUM SCISSOR MONOPOLAR ENDOCUT (ORANGE)

## (undated) DEVICE — POSITIONER STRAP ARMBOARD VELCRO TS-30

## (undated) DEVICE — UTERINE MANIPULATOR CLINICAL INNOVATIONS CLEARVIEW 7CM

## (undated) DEVICE — TROCAR COVIDIEN VERSAPORT BLADELESS OPTICAL 5MM STANDARD

## (undated) DEVICE — SUT ETHIBOND ENDOKNOT 0 42" ST3

## (undated) DEVICE — DRSG TEGADERM 2.5X3"

## (undated) DEVICE — SUT VICRYL 0 27" UR-6

## (undated) DEVICE — UTERINE MANIPULATOR COOPER SURGICAL 5MM 33CM GREEN

## (undated) DEVICE — PACK GENERAL LAPAROSCOPY

## (undated) DEVICE — TROCAR COVIDIEN VERSASTEP PLUS 11MM STANDARD

## (undated) DEVICE — PREP BETADINE SPONGE STICKS

## (undated) DEVICE — BLADE SURGICAL #15 CARBON

## (undated) DEVICE — SOL IRR POUR NS 0.9% 500ML

## (undated) DEVICE — TROCAR COVIDIEN VERSAONE BLUNT TIP HASSAN 12MM

## (undated) DEVICE — TUBING INSUFFLATION LAP FILTER 10FT

## (undated) DEVICE — INSUFFLATION NDL COVIDIEN SURGINEEDLE VERESS 120MM

## (undated) DEVICE — PACK PERI GYN